# Patient Record
Sex: FEMALE | Race: WHITE | NOT HISPANIC OR LATINO | Employment: UNEMPLOYED | URBAN - NONMETROPOLITAN AREA
[De-identification: names, ages, dates, MRNs, and addresses within clinical notes are randomized per-mention and may not be internally consistent; named-entity substitution may affect disease eponyms.]

---

## 2019-03-22 LAB
EXTERNAL ABO GROUPING: NORMAL
EXTERNAL ANTIBODY SCREEN: NEGATIVE
EXTERNAL HEPATITIS B SURFACE ANTIGEN: NEGATIVE
EXTERNAL RH FACTOR: NEGATIVE
EXTERNAL RUBELLA QUALITATIVE: NORMAL
EXTERNAL SYPHILIS RPR SCREEN: NORMAL
HIV1 P24 AG SERPL QL IA: NORMAL

## 2019-07-30 LAB
EXTERNAL CHLAMYDIA SCREEN: NEGATIVE
EXTERNAL GONORRHEA SCREEN: NEGATIVE

## 2019-08-07 LAB — EXTERNAL GROUP B STREP ANTIGEN: NEGATIVE

## 2019-08-16 ENCOUNTER — HOSPITAL ENCOUNTER (INPATIENT)
Facility: HOSPITAL | Age: 19
LOS: 2 days | Discharge: HOME | End: 2019-08-18
Attending: OBSTETRICS & GYNECOLOGY | Admitting: OBSTETRICS & GYNECOLOGY

## 2019-08-16 ENCOUNTER — ANESTHESIA EVENT (OUTPATIENT)
Dept: LABOR AND DELIVERY | Facility: HOSPITAL | Age: 19
End: 2019-08-16

## 2019-08-16 ENCOUNTER — ANESTHESIA (OUTPATIENT)
Dept: LABOR AND DELIVERY | Facility: HOSPITAL | Age: 19
End: 2019-08-16

## 2019-08-16 ENCOUNTER — HOSPITAL ENCOUNTER (OUTPATIENT)
Dept: LABOR AND DELIVERY | Facility: HOSPITAL | Age: 19
Discharge: HOME | End: 2019-08-16

## 2019-08-16 PROBLEM — O36.5931 IUGR (INTRAUTERINE GROWTH RESTRICTION) AFFECTING CARE OF MOTHER, THIRD TRIMESTER, FETUS 1: Status: ACTIVE | Noted: 2019-08-16

## 2019-08-16 LAB
6-ACETYL MORPHINE: NEGATIVE
ABO GROUP BLD: NORMAL
AMPHET+METHAMPHET UR QL: NEGATIVE
BARBITURATES UR QL SCN: NEGATIVE
BASOPHILS # BLD AUTO: 0.01 10*3/MM3 (ref 0–0.2)
BASOPHILS NFR BLD AUTO: 0.1 % (ref 0–1.5)
BENZODIAZ UR QL SCN: NEGATIVE
BLD GP AB SCN SERPL QL: POSITIVE
BUPRENORPHINE SERPL-MCNC: NEGATIVE NG/ML
CANNABINOIDS SERPL QL: NEGATIVE
COCAINE UR QL: NEGATIVE
DEPRECATED RDW RBC AUTO: 43.9 FL (ref 37–54)
EOSINOPHIL # BLD AUTO: 0.12 10*3/MM3 (ref 0–0.4)
EOSINOPHIL NFR BLD AUTO: 1 % (ref 0.3–6.2)
ERYTHROCYTE [DISTWIDTH] IN BLOOD BY AUTOMATED COUNT: 12.8 % (ref 12.3–15.4)
HCT VFR BLD AUTO: 36.1 % (ref 34–46.6)
HGB BLD-MCNC: 12.4 G/DL (ref 12–15.9)
IMM GRANULOCYTES # BLD AUTO: 0.17 10*3/MM3 (ref 0–0.05)
IMM GRANULOCYTES NFR BLD AUTO: 1.5 % (ref 0–0.5)
LYMPHOCYTES # BLD AUTO: 2 10*3/MM3 (ref 0.7–3.1)
LYMPHOCYTES NFR BLD AUTO: 17.5 % (ref 19.6–45.3)
MCH RBC QN AUTO: 33.6 PG (ref 26.6–33)
MCHC RBC AUTO-ENTMCNC: 34.3 G/DL (ref 31.5–35.7)
MCV RBC AUTO: 97.8 FL (ref 79–97)
METHADONE UR QL SCN: NEGATIVE
MONOCYTES # BLD AUTO: 0.9 10*3/MM3 (ref 0.1–0.9)
MONOCYTES NFR BLD AUTO: 7.9 % (ref 5–12)
NEUTROPHILS # BLD AUTO: 8.23 10*3/MM3 (ref 1.7–7)
NEUTROPHILS NFR BLD AUTO: 72 % (ref 42.7–76)
OPIATES UR QL: NEGATIVE
OXYCODONE UR QL SCN: NEGATIVE
PCP UR QL SCN: NEGATIVE
PLATELET # BLD AUTO: 226 10*3/MM3 (ref 140–450)
PMV BLD AUTO: 10.7 FL (ref 6–12)
RBC # BLD AUTO: 3.69 10*6/MM3 (ref 3.77–5.28)
RESIDUAL RHIG DETECTED: NORMAL
RH BLD: NEGATIVE
T&S EXPIRATION DATE: NORMAL
WBC NRBC COR # BLD: 11.43 10*3/MM3 (ref 3.4–10.8)

## 2019-08-16 PROCEDURE — 80307 DRUG TEST PRSMV CHEM ANLYZR: CPT | Performed by: OBSTETRICS & GYNECOLOGY

## 2019-08-16 PROCEDURE — 86870 RBC ANTIBODY IDENTIFICATION: CPT | Performed by: OBSTETRICS & GYNECOLOGY

## 2019-08-16 PROCEDURE — 59025 FETAL NON-STRESS TEST: CPT

## 2019-08-16 PROCEDURE — C1755 CATHETER, INTRASPINAL: HCPCS

## 2019-08-16 PROCEDURE — 86901 BLOOD TYPING SEROLOGIC RH(D): CPT

## 2019-08-16 PROCEDURE — 86901 BLOOD TYPING SEROLOGIC RH(D): CPT | Performed by: OBSTETRICS & GYNECOLOGY

## 2019-08-16 PROCEDURE — 99253 IP/OBS CNSLTJ NEW/EST LOW 45: CPT | Performed by: PSYCHIATRY & NEUROLOGY

## 2019-08-16 PROCEDURE — 85025 COMPLETE CBC W/AUTO DIFF WBC: CPT | Performed by: OBSTETRICS & GYNECOLOGY

## 2019-08-16 PROCEDURE — 86850 RBC ANTIBODY SCREEN: CPT | Performed by: OBSTETRICS & GYNECOLOGY

## 2019-08-16 PROCEDURE — 86900 BLOOD TYPING SEROLOGIC ABO: CPT

## 2019-08-16 PROCEDURE — 10907ZC DRAINAGE OF AMNIOTIC FLUID, THERAPEUTIC FROM PRODUCTS OF CONCEPTION, VIA NATURAL OR ARTIFICIAL OPENING: ICD-10-PCS | Performed by: OBSTETRICS & GYNECOLOGY

## 2019-08-16 PROCEDURE — 86900 BLOOD TYPING SEROLOGIC ABO: CPT | Performed by: OBSTETRICS & GYNECOLOGY

## 2019-08-16 PROCEDURE — 36415 COLL VENOUS BLD VENIPUNCTURE: CPT | Performed by: OBSTETRICS & GYNECOLOGY

## 2019-08-16 RX ORDER — IBUPROFEN 800 MG/1
800 TABLET ORAL 3 TIMES DAILY
Status: DISCONTINUED | OUTPATIENT
Start: 2019-08-16 | End: 2019-08-18 | Stop reason: HOSPADM

## 2019-08-16 RX ORDER — OXYTOCIN-SODIUM CHLORIDE 0.9% IV SOLN 30 UNIT/500ML 30-0.9/5 UT/ML-%
250 SOLUTION INTRAVENOUS CONTINUOUS
Status: ACTIVE | OUTPATIENT
Start: 2019-08-16 | End: 2019-08-16

## 2019-08-16 RX ORDER — PRENATAL VIT/IRON FUM/FOLIC AC 27MG-0.8MG
1 TABLET ORAL DAILY
Status: DISCONTINUED | OUTPATIENT
Start: 2019-08-17 | End: 2019-08-16

## 2019-08-16 RX ORDER — MAGNESIUM HYDROXIDE 1200 MG/15ML
1000 LIQUID ORAL ONCE AS NEEDED
Status: COMPLETED | OUTPATIENT
Start: 2019-08-16 | End: 2019-08-16

## 2019-08-16 RX ORDER — DOCUSATE SODIUM 100 MG/1
100 CAPSULE, LIQUID FILLED ORAL DAILY
Status: DISCONTINUED | OUTPATIENT
Start: 2019-08-16 | End: 2019-08-18 | Stop reason: HOSPADM

## 2019-08-16 RX ORDER — IBUPROFEN 800 MG/1
800 TABLET ORAL EVERY 8 HOURS SCHEDULED
Status: DISCONTINUED | OUTPATIENT
Start: 2019-08-16 | End: 2019-08-16 | Stop reason: HOSPADM

## 2019-08-16 RX ORDER — SODIUM CHLORIDE 0.9 % (FLUSH) 0.9 %
1-10 SYRINGE (ML) INJECTION AS NEEDED
Status: DISCONTINUED | OUTPATIENT
Start: 2019-08-16 | End: 2019-08-18 | Stop reason: HOSPADM

## 2019-08-16 RX ORDER — LIDOCAINE HYDROCHLORIDE 20 MG/ML
INJECTION, SOLUTION EPIDURAL; INFILTRATION; INTRACAUDAL; PERINEURAL
Status: DISCONTINUED
Start: 2019-08-16 | End: 2019-08-18 | Stop reason: HOSPADM

## 2019-08-16 RX ORDER — LIDOCAINE HYDROCHLORIDE AND EPINEPHRINE 10; 10 MG/ML; UG/ML
INJECTION, SOLUTION INFILTRATION; PERINEURAL AS NEEDED
Status: SHIPPED | OUTPATIENT
Start: 2019-08-16

## 2019-08-16 RX ORDER — ONDANSETRON 2 MG/ML
4 INJECTION INTRAMUSCULAR; INTRAVENOUS EVERY 6 HOURS PRN
Status: DISCONTINUED | OUTPATIENT
Start: 2019-08-16 | End: 2019-08-16

## 2019-08-16 RX ORDER — SODIUM CHLORIDE 0.9 % (FLUSH) 0.9 %
3-10 SYRINGE (ML) INJECTION AS NEEDED
Status: DISCONTINUED | OUTPATIENT
Start: 2019-08-16 | End: 2019-08-16

## 2019-08-16 RX ORDER — CARBOPROST TROMETHAMINE 250 UG/ML
250 INJECTION, SOLUTION INTRAMUSCULAR AS NEEDED
Status: DISCONTINUED | OUTPATIENT
Start: 2019-08-16 | End: 2019-08-16 | Stop reason: HOSPADM

## 2019-08-16 RX ORDER — METHYLERGONOVINE MALEATE 0.2 MG/ML
200 INJECTION INTRAVENOUS ONCE AS NEEDED
Status: DISCONTINUED | OUTPATIENT
Start: 2019-08-16 | End: 2019-08-16 | Stop reason: HOSPADM

## 2019-08-16 RX ORDER — PRENATAL VIT/IRON FUM/FOLIC AC 27MG-0.8MG
1 TABLET ORAL DAILY
Status: ON HOLD | COMMUNITY
End: 2021-03-18

## 2019-08-16 RX ORDER — TERBUTALINE SULFATE 1 MG/ML
0.25 INJECTION, SOLUTION SUBCUTANEOUS AS NEEDED
Status: DISCONTINUED | OUTPATIENT
Start: 2019-08-16 | End: 2019-08-16

## 2019-08-16 RX ORDER — BISACODYL 10 MG
10 SUPPOSITORY, RECTAL RECTAL DAILY PRN
Status: DISCONTINUED | OUTPATIENT
Start: 2019-08-17 | End: 2019-08-18 | Stop reason: HOSPADM

## 2019-08-16 RX ORDER — HYDROCODONE BITARTRATE AND ACETAMINOPHEN 5; 325 MG/1; MG/1
1 TABLET ORAL EVERY 4 HOURS PRN
Status: DISCONTINUED | OUTPATIENT
Start: 2019-08-16 | End: 2019-08-18 | Stop reason: HOSPADM

## 2019-08-16 RX ORDER — ACETAMINOPHEN 325 MG/1
650 TABLET ORAL EVERY 4 HOURS PRN
Status: DISCONTINUED | OUTPATIENT
Start: 2019-08-16 | End: 2019-08-16

## 2019-08-16 RX ORDER — ONDANSETRON 2 MG/ML
4 INJECTION INTRAMUSCULAR; INTRAVENOUS EVERY 6 HOURS PRN
Status: DISCONTINUED | OUTPATIENT
Start: 2019-08-16 | End: 2019-08-18 | Stop reason: HOSPADM

## 2019-08-16 RX ORDER — ROPIVACAINE HYDROCHLORIDE 2 MG/ML
INJECTION, SOLUTION EPIDURAL; INFILTRATION; PERINEURAL
Status: DISCONTINUED
Start: 2019-08-16 | End: 2019-08-18 | Stop reason: HOSPADM

## 2019-08-16 RX ORDER — ONDANSETRON 4 MG/1
4 TABLET, FILM COATED ORAL EVERY 6 HOURS PRN
Status: DISCONTINUED | OUTPATIENT
Start: 2019-08-16 | End: 2019-08-16

## 2019-08-16 RX ORDER — BUTORPHANOL TARTRATE 1 MG/ML
1 INJECTION, SOLUTION INTRAMUSCULAR; INTRAVENOUS
Status: DISCONTINUED | OUTPATIENT
Start: 2019-08-16 | End: 2019-08-16

## 2019-08-16 RX ORDER — OXYTOCIN-SODIUM CHLORIDE 0.9% IV SOLN 30 UNIT/500ML 30-0.9/5 UT/ML-%
125 SOLUTION INTRAVENOUS CONTINUOUS PRN
Status: DISCONTINUED | OUTPATIENT
Start: 2019-08-16 | End: 2019-08-18 | Stop reason: HOSPADM

## 2019-08-16 RX ORDER — MISOPROSTOL 100 UG/1
600 TABLET ORAL ONCE AS NEEDED
Status: COMPLETED | OUTPATIENT
Start: 2019-08-16 | End: 2019-08-16

## 2019-08-16 RX ORDER — MINERAL OIL
30 OIL (ML) MISCELLANEOUS AS NEEDED
Status: DISCONTINUED | OUTPATIENT
Start: 2019-08-16 | End: 2019-08-16

## 2019-08-16 RX ORDER — LANOLIN 100 %
OINTMENT (GRAM) TOPICAL
Status: DISCONTINUED | OUTPATIENT
Start: 2019-08-16 | End: 2019-08-18 | Stop reason: HOSPADM

## 2019-08-16 RX ORDER — LIDOCAINE HYDROCHLORIDE 10 MG/ML
INJECTION, SOLUTION EPIDURAL; INFILTRATION; INTRACAUDAL; PERINEURAL
Status: DISCONTINUED
Start: 2019-08-16 | End: 2019-08-18 | Stop reason: HOSPADM

## 2019-08-16 RX ORDER — OXYTOCIN-SODIUM CHLORIDE 0.9% IV SOLN 30 UNIT/500ML 30-0.9/5 UT/ML-%
2-30 SOLUTION INTRAVENOUS
Status: DISCONTINUED | OUTPATIENT
Start: 2019-08-16 | End: 2019-08-16

## 2019-08-16 RX ORDER — ONDANSETRON 4 MG/1
4 TABLET, FILM COATED ORAL EVERY 8 HOURS PRN
Status: DISCONTINUED | OUTPATIENT
Start: 2019-08-16 | End: 2019-08-18 | Stop reason: HOSPADM

## 2019-08-16 RX ORDER — OXYTOCIN-SODIUM CHLORIDE 0.9% IV SOLN 30 UNIT/500ML 30-0.9/5 UT/ML-%
999 SOLUTION INTRAVENOUS ONCE
Status: DISCONTINUED | OUTPATIENT
Start: 2019-08-16 | End: 2019-08-18 | Stop reason: HOSPADM

## 2019-08-16 RX ORDER — SODIUM CHLORIDE, SODIUM LACTATE, POTASSIUM CHLORIDE, CALCIUM CHLORIDE 600; 310; 30; 20 MG/100ML; MG/100ML; MG/100ML; MG/100ML
125 INJECTION, SOLUTION INTRAVENOUS CONTINUOUS
Status: DISCONTINUED | OUTPATIENT
Start: 2019-08-16 | End: 2019-08-16

## 2019-08-16 RX ORDER — MISOPROSTOL 100 UG/1
600 TABLET ORAL AS NEEDED
Status: DISCONTINUED | OUTPATIENT
Start: 2019-08-16 | End: 2019-08-16 | Stop reason: HOSPADM

## 2019-08-16 RX ADMIN — BENZOCAINE 1 APPLICATION: 5.6 OINTMENT TOPICAL at 16:17

## 2019-08-16 RX ADMIN — SODIUM CHLORIDE, POTASSIUM CHLORIDE, SODIUM LACTATE AND CALCIUM CHLORIDE 1000 ML: 600; 310; 30; 20 INJECTION, SOLUTION INTRAVENOUS at 11:20

## 2019-08-16 RX ADMIN — HYDROCODONE BITARTRATE AND ACETAMINOPHEN 1 TABLET: 5; 325 TABLET ORAL at 20:59

## 2019-08-16 RX ADMIN — OXYTOCIN 2 MILLI-UNITS/MIN: 10 INJECTION INTRAVENOUS at 07:30

## 2019-08-16 RX ADMIN — HYDROCODONE BITARTRATE AND ACETAMINOPHEN 1 TABLET: 5; 325 TABLET ORAL at 16:17

## 2019-08-16 RX ADMIN — BENZOCAINE AND LEVOMENTHOL: 200; 5 SPRAY TOPICAL at 16:17

## 2019-08-16 RX ADMIN — IBUPROFEN 800 MG: 800 TABLET, FILM COATED ORAL at 20:54

## 2019-08-16 RX ADMIN — MISOPROSTOL 600 MCG: 100 TABLET ORAL at 20:53

## 2019-08-16 RX ADMIN — WITCH HAZEL 1 PAD: 500 SOLUTION RECTAL; TOPICAL at 16:17

## 2019-08-16 RX ADMIN — IBUPROFEN 800 MG: 800 TABLET, FILM COATED ORAL at 16:16

## 2019-08-16 RX ADMIN — SODIUM CHLORIDE 1000 ML: 900 IRRIGANT IRRIGATION at 13:53

## 2019-08-16 RX ADMIN — HYDROCORTISONE 2.5% 1 APPLICATION: 25 CREAM TOPICAL at 16:17

## 2019-08-16 RX ADMIN — SODIUM CHLORIDE, POTASSIUM CHLORIDE, SODIUM LACTATE AND CALCIUM CHLORIDE 125 ML/HR: 600; 310; 30; 20 INJECTION, SOLUTION INTRAVENOUS at 07:25

## 2019-08-16 RX ADMIN — LIDOCAINE HYDROCHLORIDE AND EPINEPHRINE 2 ML: 10; 10 INJECTION, SOLUTION INFILTRATION; PERINEURAL at 11:44

## 2019-08-16 NOTE — CONSULTS
Referring Provider: Dr. March  Reason for Consultation: Agitation      Chief complaint/Focus of Exam: anger and agitation    Subjective .     History of present illness:  The patient is a 18 y/o female with past history significant for oppositional defiant disorder, and depression. She came in to L&D for induction and after three hours got impatient and wanted to leave, and at that point was quite agitated and non-cooperative with the staff and also voiced homicidal ideations, no one in particular. Psychiatry was consulted for assesment of patient's mental status and capacity to make informed decisions. The patient was seen in her room with nursing staff present. She turned her face away and refused to answer any question. She is able to understand the questions as she shook and nodded her head a couple of times, but appears to be angry and is making a deliberate effort to not cooperate.    Past Psych History: Patient was admitted to the [facility] in 2015 at age 14, when she refused to go to school and threatened suicide. She was also in the partial program for about a month.    Substance Use History: Pt has admitted to smoking marijuana earlier in the pregnancy but her UDS is negative.    Review of Systems  The patient refused to cooperate.    History  Past Medical History:   Diagnosis Date   • Anxiety    • Depression    • Urogenital trichomoniasis    ,   Past Surgical History:   Procedure Laterality Date   • EYE SURGERY Bilateral     GROWTH REMOVAL   ,   Family History   Problem Relation Age of Onset   • Lung cancer Father    • No Known Problems Mother    • No Known Problems Brother    • Seizures Sister    • Hydrocephalus Sister    • No Known Problems Paternal Grandfather    • No Known Problems Paternal Grandmother    • Hypertension Maternal Grandmother    • Diabetes Maternal Grandmother    • Coronary artery disease Maternal Grandmother    • Alzheimer's disease Maternal Grandmother    •  Lung cancer Maternal Grandfather    ,   Social History     Tobacco Use   • Smoking status: Current Every Day Smoker     Packs/day: 1.00     Years: 5.00     Pack years: 5.00   • Smokeless tobacco: Never Used   Substance Use Topics   • Alcohol use: No     Frequency: Never   • Drug use: Yes     Types: Marijuana   ,   Medications Prior to Admission   Medication Sig Dispense Refill Last Dose   • Prenatal Vit-Fe Fumarate-FA (PRENATAL VITAMIN 27-0.8) 27-0.8 MG tablet tablet Take 1 tablet by mouth Daily.   8/16/2019 at AM   , Scheduled Meds:    [START ON 8/17/2019] prenatal vitamin 27-0.8 1 tablet Oral Daily   , Continuous Infusions:    lactated ringers 125 mL/hr Last Rate: 125 mL/hr (08/16/19 0725)   oxytocin 2-30 branden-units/min Last Rate: 10 branden-units/min (08/16/19 0930)   , PRN Meds:  •  acetaminophen  •  butorphanol  •  butorphanol  •  lactated ringers  •  mineral oil  •  ondansetron **OR** ondansetron  •  sodium chloride  •  sodium chloride  •  terbutaline and Allergies:  Patient has no known allergies.    Objective     Vital Signs   Temp:  [96.4 °F (35.8 °C)-97.3 °F (36.3 °C)] 97.3 °F (36.3 °C)  Heart Rate:  [69-74] 70  Resp:  [18] 18  BP: (102-119)/(62-73) 112/65    Mental Status Exam:   Mental Status Exam:    Hygiene:   fair  Cooperation:  Evasive  Eye Contact:  Poor  Psychomotor Behavior:  Appropriate  Affect:  Restricted  Speech:  Minimal  Thought Progress:  Goal directed  Thought Content:  Normal  Suicidal: The patient refused to cooperate.  Homicidal:  The patient refused to cooperate.  Hallucinations:  None apparent  Delusion:  None  Memory:  The patient refused to cooperate.  Orientation:  The patient refused to cooperate.  Reliability:  poor  Insight:  Poor  Judgement:  Poor  Impulse Control:  Poor    Results Review:   I reviewed the patient's new clinical results.  Lab Results (last 24 hours)     Procedure Component Value Units Date/Time    Urine Drug Screen - Urine, Clean Catch [[id number]]  (Normal)  Collected:  08/16/19 0617    Specimen:  Urine, Clean Catch Updated:  08/16/19 0809     Amphetamine Screen, Urine Negative     Barbiturates Screen, Urine Negative     Benzodiazepine Screen, Urine Negative     Cocaine Screen, Urine Negative     Methadone Screen, Urine Negative     Opiate Screen Negative     Phencyclidine (PCP), Urine Negative     THC, Screen, Urine Negative     6-ACETYL MORPHINE Negative     Buprenorphine, Screen, Urine Negative     Oxycodone Screen, Urine Negative    Narrative:       Negative Thresholds For Drugs Screened:                  Amphetamines              1000 ng/ml               Barbiturates               200 ng/ml               Benzodiazepines            200 ng/ml              Cocaine                    300 ng/ml              Methadone                  300 ng/ml              Opiates                    300 ng/ml               Phencyclidine               25 ng/ml               THC                         50 ng/ml              6-Acetyl Morphine           10 ng/ml              Buprenorphine                5 ng/ml              Oxycodone                  300 ng/ml    The reference range for all drugs tested is negative. This report includes final unconfirmed qualitative results to be used for medical treatment purposes only. Unconfirmed results must not be used for non-medical purposes such as employment or legal testing. Clinical consideration should be applied to any drug of abuse test, especially when unconfirmed quantitative results are used.      CBC & Differential [[id number]] Collected:  08/16/19 0634    Specimen:  Blood Updated:  08/16/19 0759    Narrative:       The following orders were created for panel order CBC & Differential.  Procedure                               Abnormality         Status                     ---------                               -----------         ------                     CBC Auto Differential[[id number]]        Abnormal            Final result                  Please view results for these tests on the individual orders.    CBC Auto Differential [[id number]]  (Abnormal) Collected:  08/16/19 0634    Specimen:  Blood Updated:  08/16/19 0759     WBC 11.43 10*3/mm3      RBC 3.69 10*6/mm3      Hemoglobin 12.4 g/dL      Hematocrit 36.1 %      MCV 97.8 fL      MCH 33.6 pg      MCHC 34.3 g/dL      RDW 12.8 %      RDW-SD 43.9 fl      MPV 10.7 fL      Platelets 226 10*3/mm3      Neutrophil % 72.0 %      Lymphocyte % 17.5 %      Monocyte % 7.9 %      Eosinophil % 1.0 %      Basophil % 0.1 %      Immature Grans % 1.5 %      Neutrophils, Absolute 8.23 10*3/mm3      Lymphocytes, Absolute 2.00 10*3/mm3      Monocytes, Absolute 0.90 10*3/mm3      Eosinophils, Absolute 0.12 10*3/mm3      Basophils, Absolute 0.01 10*3/mm3      Immature Grans, Absolute 0.17 10*3/mm3     Gonorrhea Screen - Swab, [[id number]] Resulted:  07/30/19     Specimen:  Swab Updated:  08/16/19 0647     External Gonorrhea Screen Negative    Hepatitis B Surface Antigen [[id number]] Resulted:  03/22/19     Specimen:  Blood Updated:  08/16/19 0647     External Hepatitis B Surface Ag Negative    RPR [[id number]] Resulted:  03/22/19     Specimen:  Blood Updated:  08/16/19 0647     External RPR Non-Reactive    Rubella Antibody, IgG [[id number]] Resulted:  03/22/19     Specimen:  Blood Updated:  08/16/19 0647     External Rubella Qual Immune    HIV-1 Antibody, EIA [[id number]] Resulted:  03/22/19     Specimen:  Blood Updated:  08/16/19 0647     External HIV Antibody Non-Reactive    Group B Streptococcus Culture - Swab, Vaginal/Rectum [[id number]] Resulted:  08/07/19     Specimen:  Swab from Vaginal/Rectum Updated:  08/16/19 0647     External Strep Group B Ag Negative    Chlamydia trachomatis, Neisseria gonorrhoeae, PCR w/ confirmation - Swab, Vagina [[id number]] Resulted:  07/30/19     Specimen:  Swab from Vagina Updated:  08/16/19 0647     External Chlamydia Screen Negative        Imaging Results (last 24 hours)     ** No results  found for the last 24 hours. **            Assessment/Plan     Active Problems:    IUGR (intrauterine growth restriction) affecting care of mother, third trimester, fetus 1     Adjustment disorder with disturbance of emotion and conduct.    The patient is making a deliberate attempt to not cooperate, and doesn't appear to be psychotic, manic or delirious. Appears more a personality issue at this time as she got tired of being in labor after 3 hours, and wanted to smoke and threatened to leave when she was informed she couldn't. The patient is obviously making poor choices and lacks insight and appropriate judgment and if she continues to act in this fashion and demands to leave, will need to ask family to take over decision making.      I discussed the patients findings and my recommendations with patient, nursing staff and primary care team    Randal Cartagena MD  08/16/19  10:33 AM

## 2019-08-16 NOTE — L&D DELIVERY NOTE
Vaginal Delivery Procedure Note    Deanna Cuello  Gestational Age: <None>         OBGYN: Baldo March MD      Pre-op Diagnosis: Complete Dilation      Anesthesia: Epidual        Detailed Description of Procedure   This is a 27-year old G1, P0 patient at 37 weeks +6 days, a patient of Dr. Bennett.  She had a 14-week ultrasound that showed an EDC of 2019.  She has IUGR at the 3rd percentile and the chart says that PDC was consulted, and they recommended delivery at 38 weeks.      She was pushing extremely ineffectively.  She had a solid epidural.  She was in the occiput anterior position, almost  but going nowhere because she was not pushing, she was just crying.  We did very gentle vacuum delivery, 1 pop off, midline episiotomy, sutured with 2-0 chromic.  Placenta delivered intact, uterus contracted well, 150 cc.  Father, mother, and baby are all stable in the birthing room at this time.           Disposition: Transfer to Women's Health Floor  Condition: Stable    Baldo March M.D     Date: 2019  Time: 1:57 PM    Deanna Cuello, 2000

## 2019-08-16 NOTE — PLAN OF CARE
Problem: Patient Care Overview  Goal: Plan of Care Review  Outcome: Ongoing (interventions implemented as appropriate)   08/16/19 0939   Coping/Psychosocial   Plan of Care Reviewed With patient;sibling     Goal: Individualization and Mutuality  Outcome: Ongoing (interventions implemented as appropriate)    Goal: Discharge Needs Assessment   08/16/19 0939   Discharge Needs Assessment   Readmission Within the Last 30 Days no previous admission in last 30 days   Concerns to be Addressed homicidal   Patient/Family Anticipates Transition to home with help/services;family members' home   Patient/Family Anticipated Services at Transition mental health services;other (see comments)  ()   Transportation Concerns car, none   Transportation Anticipated family or friend will provide   Anticipated Changes Related to Illness other (see comments)  (questionable ability to provide adequate care for infant )   Equipment Needed After Discharge none   Disability   Equipment Currently Used at Home none     Goal: Interprofessional Rounds/Family Conf  Outcome: Ongoing (interventions implemented as appropriate)      Problem: Labor (Cervical Ripen, Induct, Augment) (Adult,Obstetrics,Pediatric)  Goal: Signs and Symptoms of Listed Potential Problems Will be Absent, Minimized or Managed (Labor)  Outcome: Ongoing (interventions implemented as appropriate)   08/16/19 0939   Goal/Outcome Evaluation   Problems Assessed (Labor) all   Problems Present (Labor) none

## 2019-08-16 NOTE — H&P
HISTORY    Chief complaint  In for induction secondary to IUGR    History of present illness  This is a 27-year old G1, P0 patient at 37 weeks +6 days, a patient of Dr. Bennett.  She had a 14-week ultrasound that showed an EDC of August 31, 2019.  She has IUGR at the 3rd percentile and the chart says that PDC was consulted, and they recommended delivery at 38 weeks.    Past medical, surgical, obstetrical history  See attached prenatal record    Family history  See attached prenatal record    Social history  See attached prenatal record    Functional inquiry  See attached prenatal record                                                   PHYSICAL EXAMINATION    General  In no acute distress    HEENT  Throat and ears are clear, no masses or nodes were palpable    CVR  Heart sounds are normal with no murmurs, chest is clear to IPPA    GI/  Abdomen is soft with no masses tenderness or organomegaly.  Uterine size is commensurate with her dates.  Cervix is as noted elsewhere in chart    MS  No gross bone or joint abnormalities.                                                                    IMPRESSION    38 weeks, IUGR.                                                                TREATMENT PLAN    I ruptured her membranes for clear amniotic fluid.  She is about 3-4, 90%.  We will start Pitocin.  Expect spontaneous vaginal delivery.  Deanna Cuello, July 8, 2000

## 2019-08-16 NOTE — NURSING NOTE
"Pt's sister comes to nurses station and states pt is upset because she thought she would be delivered by now and wants a cigarette. States pt is screaming. In to see pt. Sat on pt's bed and discussed reason why pt had to remain on monitor (in case of fetal distress) and therefore she could not go smoke, but offered to get a nicotine patch for pt. Pt states she knows they do not work. Pt very upset and crying that she is not delivered yet. States she thought this was supposed to be over fast and she does not want to be here. Explained to pt she is here because her baby is growth restricted and it would be dangerous for her to go home undelivered. Pt stares off while having a conversation with her. When questioned how she knows nicotine patches do not work she states \"[facility]\". When asked what her diagnosis was in the [facility] or why she was treated there she states \"because my dad  and I wanted to kill everybody and I want to kill everybody now\". Explained to pt I would have to pass this along and she said \"I knew you would say that\". Continued to have long discussion with pt regarding the induction process and this being part of our roles of mothers. Pt continues to stare off and only states \"woohoo\" in response to most statements.  Dr March notified of pt's behavior. Psych consult notified. Spoke to lead nurse in psych. In to tell pt that someone from the [facility] will be over to see her. Pt has sheet over her head and states \"woohoo\".   "

## 2019-08-16 NOTE — H&P
Chief complaint   Chief Complaint   Patient presents with   • Scheduled Induction     PRESENTS VIA W/C TO L&D FOR JONATHAN IND OF LABOR D/T IUGR. STATES HAVING OCC CTX, DENIES VB OR LOF. STATES BABY IS MOVING WELL.        History of Present Illness: Patient is a 19 y.o. female who presents with IUP at 37w6d weeks gestation. G 1, P 0. IUGR. PDC contacted. Recommend an IOL at 38 weeks gestation. EFW 3%      Past Medical History:   Diagnosis Date   • Anxiety    • Depression    • Urogenital trichomoniasis    + UDS THC    Blood Type: O +  Fetal Gender: Male  GBS: Negative  Past Surgical History:   Procedure Laterality Date   • EYE SURGERY Bilateral     GROWTH REMOVAL     Family History   Problem Relation Age of Onset   • Lung cancer Father    • No Known Problems Mother    • No Known Problems Brother    • Seizures Sister    • Hydrocephalus Sister    • No Known Problems Paternal Grandfather    • No Known Problems Paternal Grandmother    • Hypertension Maternal Grandmother    • Diabetes Maternal Grandmother    • Coronary artery disease Maternal Grandmother    • Alzheimer's disease Maternal Grandmother    • Lung cancer Maternal Grandfather      Social History     Tobacco Use   • Smoking status: Current Every Day Smoker     Packs/day: 1.00     Years: 5.00     Pack years: 5.00   • Smokeless tobacco: Never Used   Substance Use Topics   • Alcohol use: No     Frequency: Never   • Drug use: Yes     Types: Marijuana     Medications Prior to Admission   Medication Sig Dispense Refill Last Dose   • Prenatal Vit-Fe Fumarate-FA (PRENATAL VITAMIN 27-0.8) 27-0.8 MG tablet tablet Take 1 tablet by mouth Daily.   8/16/2019 at AM     Allergies:  Patient has no known allergies.      Vital Signs  Temp:  [96.4 °F (35.8 °C)] 96.4 °F (35.8 °C)  Heart Rate:  [73] 73  Resp:  [18] 18  BP: (119)/(73) 119/73    Radiology  Imaging Results (last 24 hours)     ** No results found for the last 24 hours. **          Labs  Lab Results (last 24 hours)      Procedure Component Value Units Date/Time    Urine Drug Screen - Urine, Clean Catch [[id number]]  (Normal) Collected:  08/16/19 0617    Specimen:  Urine, Clean Catch Updated:  08/16/19 0809     Amphetamine Screen, Urine Negative     Barbiturates Screen, Urine Negative     Benzodiazepine Screen, Urine Negative     Cocaine Screen, Urine Negative     Methadone Screen, Urine Negative     Opiate Screen Negative     Phencyclidine (PCP), Urine Negative     THC, Screen, Urine Negative     6-ACETYL MORPHINE Negative     Buprenorphine, Screen, Urine Negative     Oxycodone Screen, Urine Negative    Narrative:       Negative Thresholds For Drugs Screened:                  Amphetamines              1000 ng/ml               Barbiturates               200 ng/ml               Benzodiazepines            200 ng/ml              Cocaine                    300 ng/ml              Methadone                  300 ng/ml              Opiates                    300 ng/ml               Phencyclidine               25 ng/ml               THC                         50 ng/ml              6-Acetyl Morphine           10 ng/ml              Buprenorphine                5 ng/ml              Oxycodone                  300 ng/ml    The reference range for all drugs tested is negative. This report includes final unconfirmed qualitative results to be used for medical treatment purposes only. Unconfirmed results must not be used for non-medical purposes such as employment or legal testing. Clinical consideration should be applied to any drug of abuse test, especially when unconfirmed quantitative results are used.      CBC & Differential [[id number]] Collected:  08/16/19 0634    Specimen:  Blood Updated:  08/16/19 3001    Narrative:       The following orders were created for panel order CBC & Differential.  Procedure                               Abnormality         Status                     ---------                               -----------         ------                      CBC Auto Differential[[id number]]        Abnormal            Final result                 Please view results for these tests on the individual orders.    CBC Auto Differential [[id number]]  (Abnormal) Collected:  08/16/19 0634    Specimen:  Blood Updated:  08/16/19 0759     WBC 11.43 10*3/mm3      RBC 3.69 10*6/mm3      Hemoglobin 12.4 g/dL      Hematocrit 36.1 %      MCV 97.8 fL      MCH 33.6 pg      MCHC 34.3 g/dL      RDW 12.8 %      RDW-SD 43.9 fl      MPV 10.7 fL      Platelets 226 10*3/mm3      Neutrophil % 72.0 %      Lymphocyte % 17.5 %      Monocyte % 7.9 %      Eosinophil % 1.0 %      Basophil % 0.1 %      Immature Grans % 1.5 %      Neutrophils, Absolute 8.23 10*3/mm3      Lymphocytes, Absolute 2.00 10*3/mm3      Monocytes, Absolute 0.90 10*3/mm3      Eosinophils, Absolute 0.12 10*3/mm3      Basophils, Absolute 0.01 10*3/mm3      Immature Grans, Absolute 0.17 10*3/mm3     Gonorrhea Screen - Swab, [[id number]] Resulted:  07/30/19     Specimen:  Swab Updated:  08/16/19 0647     External Gonorrhea Screen Negative    Hepatitis B Surface Antigen [[id number]] Resulted:  03/22/19     Specimen:  Blood Updated:  08/16/19 0647     External Hepatitis B Surface Ag Negative    RPR [[id number]] Resulted:  03/22/19     Specimen:  Blood Updated:  08/16/19 0647     External RPR Non-Reactive    Rubella Antibody, IgG [[id number]] Resulted:  03/22/19     Specimen:  Blood Updated:  08/16/19 0647     External Rubella Qual Immune    HIV-1 Antibody, EIA [[id number]] Resulted:  03/22/19     Specimen:  Blood Updated:  08/16/19 0647     External HIV Antibody Non-Reactive    Group B Streptococcus Culture - Swab, Vaginal/Rectum [[id number]] Resulted:  08/07/19     Specimen:  Swab from Vaginal/Rectum Updated:  08/16/19 0647     External Strep Group B Ag Negative    Chlamydia trachomatis, Neisseria gonorrhoeae, PCR w/ confirmation - Swab, Vagina [[id number]] Resulted:  07/30/19     Specimen:  Swab from Vagina Updated:   08/16/19 0647     External Chlamydia Screen Negative            Review of Systems    The following systems were reviewed and negative;  ENT, respiratory, cardiovascular, gastrointestinal, genitourinary, breast, endocrine and allergies / immunologic.      Physical Exam:      General Appearance:    Alert, cooperative, in no acute distress   Head:    Normocephalic, without obvious abnormality, atraumatic   Eyes:            Lids and lashes normal, conjunctivae and sclerae normal, no   icterus, no pallor, corneas clear, PERRLA   Ears:    Ears appear intact with no abnormalities noted   Throat:   No oral lesions, no thrush, oral mucosa moist   Neck:   No adenopathy, supple, trachea midline, no thyromegaly, no     carotid bruit, no JVD   Back:     No kyphosis present, no scoliosis present, no skin lesions,       erythema or scars, no tenderness to percussion or                   palpation,   range of motion normal   Lungs:     Clear to auscultation,respirations regular, even and                   unlabored    Heart:    Regular rhythm and normal rate, normal S1 and S2, no            murmur, no gallop, no rub, no click   Breast Exam:    Deferred   Abdomen:     Normal bowel sounds, no masses, no organomegaly, soft        non-tender, non-distended, no guarding, no rebound                 tenderness   Genitalia:    Deferred   Extremities:   Moves all extremities well, no edema, no cyanosis, no              redness   Pulses:   Pulses palpable and equal bilaterally   Skin:   No bleeding, bruising or rash   Lymph nodes:   No palpable adenopathy   Neurologic:   Cranial nerves 2 - 12 grossly intact, sensation intact, DTR        present and equal bilaterally         Assessment: Patient is a 19 y.o. female who presents with IUP at 37w6d weeks gestation. G 1, P 0. IUGR. EFW 3%. PDC consulted. Recommend IOL at 38 weeks gestation.   Chief Complaint   Patient presents with   • Scheduled Induction     PRESENTS VIA W/C TO L&D FOR [facility] IND OF  LABOR D/T IUGR. STATES HAVING OCC CTX, DENIES VB OR LOF. STATES BABY IS MOVING WELL.        Plan of Care: Admit. Proceed with an IOL per PDC.      Nav Wood III, MD  08/16/19  8:32 AM

## 2019-08-16 NOTE — ANESTHESIA PROCEDURE NOTES
Labor Epidural      Patient location during procedure: OB  Indication:at surgeon's request  Performed By  Anesthesiologist: Joe Hung MD  Preanesthetic Checklist  Completed: patient identified, site marked, surgical consent, pre-op evaluation, timeout performed, IV checked, risks and benefits discussed and monitors and equipment checked  Prep:  Pt Position:sitting  Sterile Tech:gloves, mask, sterile barrier and cap  Prep:povidone-iodine 7.5% surgical scrub  Monitoring:blood pressure monitoring  Epidural Block Procedure:  Approach:midline  Guidance:landmark technique and palpation technique  Location:L3-L4  Needle Type:Tuohy  Needle Gauge:17 G  Cath Depth at skin:9 cm  Paresthesia: none  Aspiration:negative  Test Dose:negative  Number of Attempts: 1  Post Assessment:  Dressing:occlusive dressing applied and secured with tape  Pt Tolerance:patient tolerated the procedure well with no apparent complications  Complications:no

## 2019-08-16 NOTE — PROGRESS NOTES
Case Management/Social Work    Patient Name:  Deanna Cuello  YOB: 2000  MRN: [id number]  Admit Date:  8/16/2019          SS received consult on this date positive THC, recent consult for wanting to hurt everyone. SS reviewed the chart and spoke with the nurse. Concerns with the behavior and actions of the pt during pregnancy. SS spoke with the pt on this date. Pt delivered a baby boy Reji Bhatia. FOB is not involved. Pt’s boyfriend is at bedside. Pt does not have any other children. Pt lives at home with her sister Rosemarie and Rosemarie’s . Pt states that she has a car seat and infant care supplies needed for the infant. Pt states that she used THC about 2 months ago.     Pt states that she has dealt with depression in the past but is not on medication at this time. Pt states that her depression has turned to anger over time  .   SS made report on this date to Central Intake regarding Dr. Cartagena's note and the concerns from nurses stating that the pt made comments after the infant was born about how it was nasty. Report was made to [facility] on call worker. Report not accepted. SS to be contacted with any other concerns.     Infant and the pt to be transported home by her sister Rosemarie.     SS will follow up with infants Meconium.      Electronically signed by:  Shanique Zamorano  08/16/19 4:45 PM

## 2019-08-16 NOTE — PLAN OF CARE
Problem: Patient Care Overview  Goal: Plan of Care Review  Outcome: Ongoing (interventions implemented as appropriate)   08/16/19 9397   Coping/Psychosocial   Plan of Care Reviewed With patient   Plan of Care Review   Progress improving   OTHER   Outcome Summary Fundus firm, small rubra bleeding. Hem+. Voiding well. INT patent. Norco as needed for pain.     Goal: Individualization and Mutuality  Outcome: Ongoing (interventions implemented as appropriate)    Goal: Discharge Needs Assessment  Outcome: Ongoing (interventions implemented as appropriate)    Goal: Interprofessional Rounds/Family Conf  Outcome: Ongoing (interventions implemented as appropriate)      Problem: Postpartum (Vaginal Delivery) (Adult,Obstetrics,Pediatric)  Goal: Signs and Symptoms of Listed Potential Problems Will be Absent, Minimized or Managed (Postpartum)  Outcome: Ongoing (interventions implemented as appropriate)

## 2019-08-17 LAB
ABO GROUP BLD: NORMAL
BASOPHILS # BLD AUTO: 0.01 10*3/MM3 (ref 0–0.2)
BASOPHILS NFR BLD AUTO: 0.1 % (ref 0–1.5)
DEPRECATED RDW RBC AUTO: 42.7 FL (ref 37–54)
EOSINOPHIL # BLD AUTO: 0.13 10*3/MM3 (ref 0–0.4)
EOSINOPHIL NFR BLD AUTO: 1.3 % (ref 0.3–6.2)
ERYTHROCYTE [DISTWIDTH] IN BLOOD BY AUTOMATED COUNT: 12.6 % (ref 12.3–15.4)
FETAL BLEED: NEGATIVE
HCT VFR BLD AUTO: 28.2 % (ref 34–46.6)
HGB BLD-MCNC: 9.3 G/DL (ref 12–15.9)
IMM GRANULOCYTES # BLD AUTO: 0.08 10*3/MM3 (ref 0–0.05)
IMM GRANULOCYTES NFR BLD AUTO: 0.8 % (ref 0–0.5)
LYMPHOCYTES # BLD AUTO: 2.25 10*3/MM3 (ref 0.7–3.1)
LYMPHOCYTES NFR BLD AUTO: 23 % (ref 19.6–45.3)
MCH RBC QN AUTO: 32.6 PG (ref 26.6–33)
MCHC RBC AUTO-ENTMCNC: 33 G/DL (ref 31.5–35.7)
MCV RBC AUTO: 98.9 FL (ref 79–97)
MONOCYTES # BLD AUTO: 0.87 10*3/MM3 (ref 0.1–0.9)
MONOCYTES NFR BLD AUTO: 8.9 % (ref 5–12)
NEUTROPHILS # BLD AUTO: 6.44 10*3/MM3 (ref 1.7–7)
NEUTROPHILS NFR BLD AUTO: 65.9 % (ref 42.7–76)
NUMBER OF DOSES: NORMAL
PLATELET # BLD AUTO: 195 10*3/MM3 (ref 140–450)
PMV BLD AUTO: 10.3 FL (ref 6–12)
RBC # BLD AUTO: 2.85 10*6/MM3 (ref 3.77–5.28)
RH BLD: NEGATIVE
WBC NRBC COR # BLD: 9.78 10*3/MM3 (ref 3.4–10.8)

## 2019-08-17 PROCEDURE — 25010000003 RHO D IMMUNE GLOBULIN 1500 UNITS SOLUTION PREFILLED SYRINGE: Performed by: OBSTETRICS & GYNECOLOGY

## 2019-08-17 PROCEDURE — 85461 HEMOGLOBIN FETAL: CPT | Performed by: OBSTETRICS & GYNECOLOGY

## 2019-08-17 PROCEDURE — 85025 COMPLETE CBC W/AUTO DIFF WBC: CPT | Performed by: OBSTETRICS & GYNECOLOGY

## 2019-08-17 PROCEDURE — 86901 BLOOD TYPING SEROLOGIC RH(D): CPT | Performed by: OBSTETRICS & GYNECOLOGY

## 2019-08-17 PROCEDURE — 86900 BLOOD TYPING SEROLOGIC ABO: CPT | Performed by: OBSTETRICS & GYNECOLOGY

## 2019-08-17 RX ADMIN — IBUPROFEN 800 MG: 800 TABLET, FILM COATED ORAL at 21:36

## 2019-08-17 RX ADMIN — DOCUSATE SODIUM 100 MG: 100 CAPSULE ORAL at 08:17

## 2019-08-17 RX ADMIN — IBUPROFEN 800 MG: 800 TABLET, FILM COATED ORAL at 08:17

## 2019-08-17 RX ADMIN — HUMAN RHO(D) IMMUNE GLOBULIN 1500 UNITS: 300 INJECTION, SOLUTION INTRAMUSCULAR at 15:55

## 2019-08-17 RX ADMIN — HYDROCODONE BITARTRATE AND ACETAMINOPHEN 1 TABLET: 5; 325 TABLET ORAL at 01:30

## 2019-08-17 RX ADMIN — HYDROCODONE BITARTRATE AND ACETAMINOPHEN 1 TABLET: 5; 325 TABLET ORAL at 08:18

## 2019-08-17 RX ADMIN — HYDROCODONE BITARTRATE AND ACETAMINOPHEN 1 TABLET: 5; 325 TABLET ORAL at 23:22

## 2019-08-17 RX ADMIN — IBUPROFEN 800 MG: 800 TABLET, FILM COATED ORAL at 15:55

## 2019-08-17 RX ADMIN — HYDROCODONE BITARTRATE AND ACETAMINOPHEN 1 TABLET: 5; 325 TABLET ORAL at 19:21

## 2019-08-17 NOTE — PLAN OF CARE
Problem: Patient Care Overview  Goal: Plan of Care Review  Outcome: Ongoing (interventions implemented as appropriate)   08/16/19 3978   Coping/Psychosocial   Plan of Care Reviewed With patient   Plan of Care Review   Progress improving       Problem: Postpartum (Vaginal Delivery) (Adult,Obstetrics,Pediatric)  Goal: Signs and Symptoms of Listed Potential Problems Will be Absent, Minimized or Managed (Postpartum)  Outcome: Ongoing (interventions implemented as appropriate)   08/16/19 9309   Goal/Outcome Evaluation   Problems Assessed (Postpartum Vaginal Delivery) all   Problems Present (Postpartum Vag Deliv) none

## 2019-08-17 NOTE — PAYOR COMM NOTE
"[facility]  JF BLACKMON  PHONE  [phone]  FAX  [phone]  NPI:  [id number]    DELIVERY NOTIFICATION  8/16/19 @ 1347  VAGINAL DELIVERY  MALE  APGARS:  8/9  WT:  2744 GMS  ICD:  O80  DR. VALVERDE  NPI:  [id number]    Ash Roach (19 y.o. Female)     Date of Birth Social Security Number Address Home Phone MRN    2000  [id number]   [facility] [address] [phone] [id number]    Zoroastrianism Marital Status          Muslim Single       Admission Date Admission Type Admitting Provider Attending Provider Department, Room/Bed    8/16/19 Urgent AncaBaldo MD Bevins, Rachel E, DO [facility], W234/1    Discharge Date Discharge Disposition Discharge Destination                       Attending Provider:  Ade Valverde DO    Allergies:  No Known Allergies    Isolation:  None   Infection:  None   Code Status:  CPR    Ht:  160 cm (63\")   Wt:  56.7 kg (125 lb)    Admission Cmt:  None   Principal Problem:  None                Active Insurance as of 8/16/2019     Primary Coverage     Payor Plan Insurance Group Employer/Plan Group    ANTHEM MEDICAID ANTHEM MEDICAID [id number]     Payor Plan Address Payor Plan Phone Number Payor Plan Fax Number Effective Dates    [address] [address] [phone]  7/2/2019 - None Entered    [facility] [address]       Subscriber Name Subscriber Birth Date Member ID       ASH ROACH 2000 [id number]                 Emergency Contacts      (Rel.) Home Phone Work Phone Mobile Phone    Rosemarie Berkowitz (Sister) [phone] -- --               History & Physical      Nav Wood III, MD at 8/16/2019  8:31 AM                Chief complaint   Chief Complaint   Patient presents with   • Scheduled Induction     PRESENTS VIA W/C TO L&D FOR JONATHAN IND OF LABOR D/T IUGR. STATES HAVING OCC CTX, DENIES VB OR LOF. STATES BABY IS MOVING WELL.        History of Present Illness: Patient is a 19 y.o. female who presents with IUP at " 37w6d weeks gestation. G 1, P 0. IUGR. PDC contacted. Recommend an IOL at 38 weeks gestation. EFW 3%      Past Medical History:   Diagnosis Date   • Anxiety    • Depression    • Urogenital trichomoniasis    + UDS THC    Blood Type: O +  Fetal Gender: Male  GBS: Negative  Past Surgical History:   Procedure Laterality Date   • EYE SURGERY Bilateral     GROWTH REMOVAL     Family History   Problem Relation Age of Onset   • Lung cancer Father    • No Known Problems Mother    • No Known Problems Brother    • Seizures Sister    • Hydrocephalus Sister    • No Known Problems Paternal Grandfather    • No Known Problems Paternal Grandmother    • Hypertension Maternal Grandmother    • Diabetes Maternal Grandmother    • Coronary artery disease Maternal Grandmother    • Alzheimer's disease Maternal Grandmother    • Lung cancer Maternal Grandfather      Social History     Tobacco Use   • Smoking status: Current Every Day Smoker     Packs/day: 1.00     Years: 5.00     Pack years: 5.00   • Smokeless tobacco: Never Used   Substance Use Topics   • Alcohol use: No     Frequency: Never   • Drug use: Yes     Types: Marijuana     Medications Prior to Admission   Medication Sig Dispense Refill Last Dose   • Prenatal Vit-Fe Fumarate-FA (PRENATAL VITAMIN 27-0.8) 27-0.8 MG tablet tablet Take 1 tablet by mouth Daily.   8/16/2019 at AM     Allergies:  Patient has no known allergies.      Vital Signs  Temp:  [96.4 °F (35.8 °C)] 96.4 °F (35.8 °C)  Heart Rate:  [73] 73  Resp:  [18] 18  BP: (119)/(73) 119/73    Radiology  Imaging Results (last 24 hours)     ** No results found for the last 24 hours. **          Labs  Lab Results (last 24 hours)     Procedure Component Value Units Date/Time    Urine Drug Screen - Urine, Clean Catch [[id number]]  (Normal) Collected:  08/16/19 0617    Specimen:  Urine, Clean Catch Updated:  08/16/19 0809     Amphetamine Screen, Urine Negative     Barbiturates Screen, Urine Negative     Benzodiazepine Screen, Urine  Negative     Cocaine Screen, Urine Negative     Methadone Screen, Urine Negative     Opiate Screen Negative     Phencyclidine (PCP), Urine Negative     THC, Screen, Urine Negative     6-ACETYL MORPHINE Negative     Buprenorphine, Screen, Urine Negative     Oxycodone Screen, Urine Negative    Narrative:       Negative Thresholds For Drugs Screened:                  Amphetamines              1000 ng/ml               Barbiturates               200 ng/ml               Benzodiazepines            200 ng/ml              Cocaine                    300 ng/ml              Methadone                  300 ng/ml              Opiates                    300 ng/ml               Phencyclidine               25 ng/ml               THC                         50 ng/ml              6-Acetyl Morphine           10 ng/ml              Buprenorphine                5 ng/ml              Oxycodone                  300 ng/ml    The reference range for all drugs tested is negative. This report includes final unconfirmed qualitative results to be used for medical treatment purposes only. Unconfirmed results must not be used for non-medical purposes such as employment or legal testing. Clinical consideration should be applied to any drug of abuse test, especially when unconfirmed quantitative results are used.      CBC & Differential [[id number]] Collected:  08/16/19 0634    Specimen:  Blood Updated:  08/16/19 3989    Narrative:       The following orders were created for panel order CBC & Differential.  Procedure                               Abnormality         Status                     ---------                               -----------         ------                     CBC Auto Differential[[id number]]        Abnormal            Final result                 Please view results for these tests on the individual orders.    CBC Auto Differential [[id number]]  (Abnormal) Collected:  08/16/19 0634    Specimen:  Blood Updated:  08/16/19 0751     WBC  11.43 10*3/mm3      RBC 3.69 10*6/mm3      Hemoglobin 12.4 g/dL      Hematocrit 36.1 %      MCV 97.8 fL      MCH 33.6 pg      MCHC 34.3 g/dL      RDW 12.8 %      RDW-SD 43.9 fl      MPV 10.7 fL      Platelets 226 10*3/mm3      Neutrophil % 72.0 %      Lymphocyte % 17.5 %      Monocyte % 7.9 %      Eosinophil % 1.0 %      Basophil % 0.1 %      Immature Grans % 1.5 %      Neutrophils, Absolute 8.23 10*3/mm3      Lymphocytes, Absolute 2.00 10*3/mm3      Monocytes, Absolute 0.90 10*3/mm3      Eosinophils, Absolute 0.12 10*3/mm3      Basophils, Absolute 0.01 10*3/mm3      Immature Grans, Absolute 0.17 10*3/mm3     Gonorrhea Screen - Swab, [[id number]] Resulted:  07/30/19     Specimen:  Swab Updated:  08/16/19 0647     External Gonorrhea Screen Negative    Hepatitis B Surface Antigen [[id number]] Resulted:  03/22/19     Specimen:  Blood Updated:  08/16/19 0647     External Hepatitis B Surface Ag Negative    RPR [[id number]] Resulted:  03/22/19     Specimen:  Blood Updated:  08/16/19 0647     External RPR Non-Reactive    Rubella Antibody, IgG [[id number]] Resulted:  03/22/19     Specimen:  Blood Updated:  08/16/19 0647     External Rubella Qual Immune    HIV-1 Antibody, EIA [[id number]] Resulted:  03/22/19     Specimen:  Blood Updated:  08/16/19 0647     External HIV Antibody Non-Reactive    Group B Streptococcus Culture - Swab, Vaginal/Rectum [[id number]] Resulted:  08/07/19     Specimen:  Swab from Vaginal/Rectum Updated:  08/16/19 0647     External Strep Group B Ag Negative    Chlamydia trachomatis, Neisseria gonorrhoeae, PCR w/ confirmation - Swab, Vagina [[id number]] Resulted:  07/30/19     Specimen:  Swab from Vagina Updated:  08/16/19 0647     External Chlamydia Screen Negative            Review of Systems    The following systems were reviewed and negative;  ENT, respiratory, cardiovascular, gastrointestinal, genitourinary, breast, endocrine and allergies / immunologic.      Physical Exam:      General Appearance:     Alert, cooperative, in no acute distress   Head:    Normocephalic, without obvious abnormality, atraumatic   Eyes:            Lids and lashes normal, conjunctivae and sclerae normal, no   icterus, no pallor, corneas clear, PERRLA   Ears:    Ears appear intact with no abnormalities noted   Throat:   No oral lesions, no thrush, oral mucosa moist   Neck:   No adenopathy, supple, trachea midline, no thyromegaly, no     carotid bruit, no JVD   Back:     No kyphosis present, no scoliosis present, no skin lesions,       erythema or scars, no tenderness to percussion or                   palpation,   range of motion normal   Lungs:     Clear to auscultation,respirations regular, even and                   unlabored    Heart:    Regular rhythm and normal rate, normal S1 and S2, no            murmur, no gallop, no rub, no click   Breast Exam:    Deferred   Abdomen:     Normal bowel sounds, no masses, no organomegaly, soft        non-tender, non-distended, no guarding, no rebound                 tenderness   Genitalia:    Deferred   Extremities:   Moves all extremities well, no edema, no cyanosis, no              redness   Pulses:   Pulses palpable and equal bilaterally   Skin:   No bleeding, bruising or rash   Lymph nodes:   No palpable adenopathy   Neurologic:   Cranial nerves 2 - 12 grossly intact, sensation intact, DTR        present and equal bilaterally         Assessment: Patient is a 19 y.o. female who presents with IUP at 37w6d weeks gestation. G 1, P 0. IUGR. EFW 3%. PDC consulted. Recommend IOL at 38 weeks gestation.   Chief Complaint   Patient presents with   • Scheduled Induction     PRESENTS VIA W/C TO L&D FOR [facility]ATHAN IND OF LABOR D/T IUGR. STATES HAVING OCC CTX, DENIES VB OR LOF. STATES BABY IS MOVING WELL.        Plan of Care: Admit. Proceed with an IOL per PDC.      Nav Wood III, MD  08/16/19  8:32 AM      Electronically signed by Nav Wood III, MD at 8/16/2019  8:35 AM     Baldo March MD at  8/16/2019 12:55 AM                                                                         HISTORY    Chief complaint  In for induction secondary to IUGR    History of present illness  This is a 27-year old G1, P0 patient at 37 weeks +6 days, a patient of Dr. Bennett.  She had a 14-week ultrasound that showed an EDC of August 31, 2019.  She has IUGR at the 3rd percentile and the chart says that PDC was consulted, and they recommended delivery at 38 weeks.    Past medical, surgical, obstetrical history  See attached prenatal record    Family history  See attached prenatal record    Social history  See attached prenatal record    Functional inquiry  See attached prenatal record                                                   PHYSICAL EXAMINATION    General  In no acute distress    HEENT  Throat and ears are clear, no masses or nodes were palpable    CVR  Heart sounds are normal with no murmurs, chest is clear to IPPA    GI/  Abdomen is soft with no masses tenderness or organomegaly.  Uterine size is commensurate with her dates.  Cervix is as noted elsewhere in chart    MS  No gross bone or joint abnormalities.                                                                    IMPRESSION    38 weeks, IUGR.                                                                TREATMENT PLAN    I ruptured her membranes for clear amniotic fluid.  She is about 3-4, 90%.  We will start Pitocin.  Expect spontaneous vaginal delivery.  Deanna Cuello, July 8, 2000    Electronically signed by Baldo aMrch MD at 8/16/2019 10:53 AM       Physician Progress Notes (last 24 hours) (Notes from 8/16/2019  8:02 AM through 8/17/2019  8:02 AM)     No notes of this type exist for this encounter.

## 2019-08-17 NOTE — PROGRESS NOTES
This patient underwent vaginal delivery yesterday August 16, 2019.  I was the delivering doctor.  We did a gentle vacuum.  August 17 hemoglobin is 9.3, white count 9000, most recent vital signs normal.  She says that her baby has mild jaundice.  We will look toward dismissal tomorrow.  Deanna Cuello, July 8, 2000

## 2019-08-18 VITALS
SYSTOLIC BLOOD PRESSURE: 95 MMHG | OXYGEN SATURATION: 98 % | WEIGHT: 125 LBS | DIASTOLIC BLOOD PRESSURE: 48 MMHG | TEMPERATURE: 97.9 F | RESPIRATION RATE: 18 BRPM | BODY MASS INDEX: 22.15 KG/M2 | HEART RATE: 58 BPM | HEIGHT: 63 IN

## 2019-08-18 RX ORDER — DOCUSATE SODIUM 100 MG/1
100 CAPSULE, LIQUID FILLED ORAL 2 TIMES DAILY
Qty: 60 CAPSULE | Refills: 0 | Status: SHIPPED | OUTPATIENT
Start: 2019-08-18 | End: 2019-09-17

## 2019-08-18 RX ORDER — IBUPROFEN 600 MG/1
600 TABLET ORAL EVERY 6 HOURS PRN
Qty: 30 TABLET | Refills: 0 | Status: SHIPPED | OUTPATIENT
Start: 2019-08-18 | End: 2019-09-17

## 2019-08-18 RX ADMIN — HYDROCODONE BITARTRATE AND ACETAMINOPHEN 1 TABLET: 5; 325 TABLET ORAL at 07:37

## 2019-08-18 RX ADMIN — HYDROCODONE BITARTRATE AND ACETAMINOPHEN 1 TABLET: 5; 325 TABLET ORAL at 03:22

## 2019-08-18 RX ADMIN — IBUPROFEN 800 MG: 800 TABLET, FILM COATED ORAL at 08:28

## 2019-08-18 RX ADMIN — DOCUSATE SODIUM 100 MG: 100 CAPSULE ORAL at 08:28

## 2019-08-18 NOTE — DISCHARGE SUMMARY
Discharge Summary    Reason for Hospitalization:  See below    Admission and Discharge Diagnoses:  See below    Course in Hospital:  This patient underwent vaginal delivery August 16, 2019.  She was delivered because of IUGR.  Baby was at the 3rd percentile.  She required a gentle vacuum delivery.  Her postpartum hemoglobin is okay, vital signs are normal.  We are going to dismiss her with ibuprofen, Colace, and she will come see us in the office in 2 weeks in follow-up.    Discharge Condition:  Stable.    Discharge Instructions and Plans for Follow-Up:  As above    Discharge Prescriptions:  As above  Deanna Cuello, July 8, 2000

## 2019-08-18 NOTE — DISCHARGE INSTR - APPOINTMENTS
Please call [facility]'s Care at [phone] as soon as possible to schedule an appointment with Dr. Bennett.  Tell the  that you need to schedule you two-week postpartum follow-up visit.

## 2019-08-18 NOTE — PLAN OF CARE
Problem: Patient Care Overview  Goal: Plan of Care Review  Outcome: Ongoing (interventions implemented as appropriate)   08/18/19 3303   Coping/Psychosocial   Plan of Care Reviewed With patient;significant other   Plan of Care Review   Progress improving   OTHER   Outcome Summary Ambulating and voiding without difficulty.      Goal: Individualization and Mutuality  Outcome: Ongoing (interventions implemented as appropriate)    Goal: Discharge Needs Assessment  Outcome: Ongoing (interventions implemented as appropriate)      Problem: Postpartum (Vaginal Delivery) (Adult,Obstetrics,Pediatric)  Goal: Signs and Symptoms of Listed Potential Problems Will be Absent, Minimized or Managed (Postpartum)  Outcome: Ongoing (interventions implemented as appropriate)

## 2019-08-19 NOTE — PAYOR COMM NOTE
"CONTACT:  CRISTINA MACIEL MSN, RN  UTILIZATION MANAGEMENT DEPT.  [facility]  [address], [address]  PHONE:  [phone]  FAX: [phone]    PATIENT DISCHARGED TO HOME ON 8/18/19.    REFER TO AUTH # [id number]    Ash Roach (19 y.o. Female)     Date of Birth Social Security Number Address Home Phone MRN    2000  [facility]   [facility] [address] [phone] [id number]    Orthodoxy Marital Status          Anabaptism Single       Admission Date Admission Type Admitting Provider Attending Provider Department, Room/Bed    8/16/19 Urgent Baldo March MD  [facility], W234/1    Discharge Date Discharge Disposition Discharge Destination        8/18/2019 Home or Self Care              Attending Provider:  (none)   Allergies:  No Known Allergies    Isolation:  None   Infection:  None   Code Status:  Prior    Ht:  160 cm (63\")   Wt:  56.7 kg (125 lb)    Admission Cmt:  None   Principal Problem:  None                Active Insurance as of 8/16/2019     Primary Coverage     Payor Plan Insurance Group Employer/Plan Group    ANTHEM MEDICAID ANTHEM MEDICAID [id number]     Payor Plan Address Payor Plan Phone Number Payor Plan Fax Number Effective Dates    [address] [address] [phone]  7/2/2019 - None Entered    [facility] [id number]       Subscriber Name Subscriber Birth Date Member ID       ASH ROACH 2000 [id number]                 Emergency Contacts      (Rel.) Home Phone Work Phone Mobile Phone    Rosemarie Berkowitz (Sister) [phone] -- --               Discharge Summary      Baldo March MD at 8/18/2019 10:17 AM        Discharge Summary    Reason for Hospitalization:  See below    Admission and Discharge Diagnoses:  See below    Course in Hospital:  This patient underwent vaginal delivery August 16, 2019.  She was delivered because of IUGR.  Baby was at the 3rd percentile.  She required a gentle vacuum delivery.  Her postpartum " hemoglobin is okay, vital signs are normal.  We are going to dismiss her with ibuprofen, Colace, and she will come see us in the office in 2 weeks in follow-up.    Discharge Condition:  Stable.    Discharge Instructions and Plans for Follow-Up:  As above    Discharge Prescriptions:  As above  Deannapeter Cuello, July 8, 2000    Electronically signed by Baldo March MD at 8/18/2019 10:19 AM

## 2021-01-19 LAB
EXTERNAL ABO GROUPING: NORMAL
EXTERNAL ANTIBODY SCREEN: NEGATIVE
EXTERNAL CHLAMYDIA SCREEN: NEGATIVE
EXTERNAL GONORRHEA SCREEN: NEGATIVE
EXTERNAL GTT 1 HOUR: 62
EXTERNAL HEPATITIS B SURFACE ANTIGEN: NEGATIVE
EXTERNAL RH FACTOR: NEGATIVE
EXTERNAL RUBELLA QUALITATIVE: NORMAL
EXTERNAL SYPHILIS RPR SCREEN: NORMAL
HIV1 P24 AG SERPL QL IA: NORMAL

## 2021-01-29 LAB
EXTERNAL CHLAMYDIA SCREEN: NORMAL
EXTERNAL GONORRHEA SCREEN: NORMAL

## 2021-02-25 ENCOUNTER — HOSPITAL ENCOUNTER (OUTPATIENT)
Facility: HOSPITAL | Age: 21
Discharge: HOME | End: 2021-02-25
Attending: OBSTETRICS & GYNECOLOGY | Admitting: OBSTETRICS & GYNECOLOGY

## 2021-02-25 VITALS
HEART RATE: 107 BPM | HEIGHT: 62 IN | DIASTOLIC BLOOD PRESSURE: 68 MMHG | SYSTOLIC BLOOD PRESSURE: 118 MMHG | BODY MASS INDEX: 22.73 KG/M2 | RESPIRATION RATE: 18 BRPM | TEMPERATURE: 98.8 F | WEIGHT: 123.5 LBS

## 2021-02-25 PROBLEM — Z34.90 PREGNANCY: Status: ACTIVE | Noted: 2021-02-25

## 2021-02-25 LAB
6-ACETYL MORPHINE: NEGATIVE
ALBUMIN SERPL-MCNC: 3.93 G/DL (ref 3.5–5.2)
ALBUMIN/GLOB SERPL: 1.2 G/DL
ALP SERPL-CCNC: 193 U/L (ref 39–117)
ALT SERPL W P-5'-P-CCNC: 7 U/L (ref 1–33)
AMPHET+METHAMPHET UR QL: NEGATIVE
ANION GAP SERPL CALCULATED.3IONS-SCNC: 9.7 MMOL/L (ref 5–15)
AST SERPL-CCNC: 12 U/L (ref 1–32)
BACTERIA UR QL AUTO: ABNORMAL /HPF
BARBITURATES UR QL SCN: NEGATIVE
BASOPHILS # BLD AUTO: 0.02 10*3/MM3 (ref 0–0.2)
BASOPHILS NFR BLD AUTO: 0.2 % (ref 0–1.5)
BENZODIAZ UR QL SCN: NEGATIVE
BILIRUB SERPL-MCNC: 0.4 MG/DL (ref 0–1.2)
BILIRUB UR QL STRIP: ABNORMAL
BUN SERPL-MCNC: 7 MG/DL (ref 6–20)
BUN/CREAT SERPL: 17.9 (ref 7–25)
BUPRENORPHINE SERPL-MCNC: NEGATIVE NG/ML
CALCIUM SPEC-SCNC: 9.2 MG/DL (ref 8.6–10.5)
CANNABINOIDS SERPL QL: POSITIVE
CHLORIDE SERPL-SCNC: 103 MMOL/L (ref 98–107)
CLARITY UR: ABNORMAL
CO2 SERPL-SCNC: 22.3 MMOL/L (ref 22–29)
COCAINE UR QL: NEGATIVE
COLOR UR: ABNORMAL
CREAT SERPL-MCNC: 0.39 MG/DL (ref 0.57–1)
DEPRECATED RDW RBC AUTO: 43.9 FL (ref 37–54)
EOSINOPHIL # BLD AUTO: 0.05 10*3/MM3 (ref 0–0.4)
EOSINOPHIL NFR BLD AUTO: 0.5 % (ref 0.3–6.2)
ERYTHROCYTE [DISTWIDTH] IN BLOOD BY AUTOMATED COUNT: 12.3 % (ref 12.3–15.4)
GFR SERPL CREATININE-BSD FRML MDRD: >150 ML/MIN/1.73
GLOBULIN UR ELPH-MCNC: 3.2 GM/DL
GLUCOSE SERPL-MCNC: 78 MG/DL (ref 65–99)
GLUCOSE UR STRIP-MCNC: NEGATIVE MG/DL
HCT VFR BLD AUTO: 35.7 % (ref 34–46.6)
HGB BLD-MCNC: 11.9 G/DL (ref 12–15.9)
HGB UR QL STRIP.AUTO: NEGATIVE
HYALINE CASTS UR QL AUTO: ABNORMAL /LPF
IMM GRANULOCYTES # BLD AUTO: 0.14 10*3/MM3 (ref 0–0.05)
IMM GRANULOCYTES NFR BLD AUTO: 1.5 % (ref 0–0.5)
KETONES UR QL STRIP: ABNORMAL
LEUKOCYTE ESTERASE UR QL STRIP.AUTO: ABNORMAL
LYMPHOCYTES # BLD AUTO: 1.65 10*3/MM3 (ref 0.7–3.1)
LYMPHOCYTES NFR BLD AUTO: 17.4 % (ref 19.6–45.3)
MCH RBC QN AUTO: 32.3 PG (ref 26.6–33)
MCHC RBC AUTO-ENTMCNC: 33.3 G/DL (ref 31.5–35.7)
MCV RBC AUTO: 97 FL (ref 79–97)
METHADONE UR QL SCN: NEGATIVE
MONOCYTES # BLD AUTO: 0.55 10*3/MM3 (ref 0.1–0.9)
MONOCYTES NFR BLD AUTO: 5.8 % (ref 5–12)
NEUTROPHILS NFR BLD AUTO: 7.08 10*3/MM3 (ref 1.7–7)
NEUTROPHILS NFR BLD AUTO: 74.6 % (ref 42.7–76)
NITRITE UR QL STRIP: NEGATIVE
NRBC BLD AUTO-RTO: 0 /100 WBC (ref 0–0.2)
OPIATES UR QL: NEGATIVE
OXYCODONE UR QL SCN: NEGATIVE
PCP UR QL SCN: NEGATIVE
PH UR STRIP.AUTO: 6.5 [PH] (ref 5–8)
PLATELET # BLD AUTO: 221 10*3/MM3 (ref 140–450)
PMV BLD AUTO: 10.2 FL (ref 6–12)
POTASSIUM SERPL-SCNC: 3.8 MMOL/L (ref 3.5–5.2)
PROT SERPL-MCNC: 7.1 G/DL (ref 6–8.5)
PROT UR QL STRIP: ABNORMAL
RBC # BLD AUTO: 3.68 10*6/MM3 (ref 3.77–5.28)
RBC # UR: ABNORMAL /HPF
REF LAB TEST METHOD: ABNORMAL
SODIUM SERPL-SCNC: 135 MMOL/L (ref 136–145)
SP GR UR STRIP: 1.03 (ref 1–1.03)
SQUAMOUS #/AREA URNS HPF: ABNORMAL /HPF
UROBILINOGEN UR QL STRIP: ABNORMAL
WBC # BLD AUTO: 9.49 10*3/MM3 (ref 3.4–10.8)
WBC UR QL AUTO: ABNORMAL /HPF

## 2021-02-25 PROCEDURE — 80307 DRUG TEST PRSMV CHEM ANLYZR: CPT | Performed by: OBSTETRICS & GYNECOLOGY

## 2021-02-25 PROCEDURE — 85025 COMPLETE CBC W/AUTO DIFF WBC: CPT | Performed by: OBSTETRICS & GYNECOLOGY

## 2021-02-25 PROCEDURE — G0463 HOSPITAL OUTPT CLINIC VISIT: HCPCS

## 2021-02-25 PROCEDURE — 80053 COMPREHEN METABOLIC PANEL: CPT | Performed by: OBSTETRICS & GYNECOLOGY

## 2021-02-25 PROCEDURE — 87086 URINE CULTURE/COLONY COUNT: CPT | Performed by: OBSTETRICS & GYNECOLOGY

## 2021-02-25 PROCEDURE — 81001 URINALYSIS AUTO W/SCOPE: CPT | Performed by: OBSTETRICS & GYNECOLOGY

## 2021-02-25 PROCEDURE — 25010000002 BETAMETHASONE ACET & SOD PHOS PER 4 MG: Performed by: OBSTETRICS & GYNECOLOGY

## 2021-02-25 PROCEDURE — 59025 FETAL NON-STRESS TEST: CPT

## 2021-02-25 PROCEDURE — 96372 THER/PROPH/DIAG INJ SC/IM: CPT

## 2021-02-25 PROCEDURE — 36415 COLL VENOUS BLD VENIPUNCTURE: CPT | Performed by: OBSTETRICS & GYNECOLOGY

## 2021-02-25 RX ORDER — BETAMETHASONE SODIUM PHOSPHATE AND BETAMETHASONE ACETATE 3; 3 MG/ML; MG/ML
12 INJECTION, SUSPENSION INTRA-ARTICULAR; INTRALESIONAL; INTRAMUSCULAR; SOFT TISSUE EVERY 24 HOURS
Status: DISCONTINUED | OUTPATIENT
Start: 2021-02-25 | End: 2021-02-25 | Stop reason: HOSPADM

## 2021-02-25 RX ADMIN — BETAMETHASONE ACETATE AND BETAMETHASONE SODIUM PHOSPHATE 12 MG: 3; 3 INJECTION, SUSPENSION INTRA-ARTICULAR; INTRALESIONAL; INTRAMUSCULAR; SOFT TISSUE at 20:30

## 2021-02-26 ENCOUNTER — HOSPITAL ENCOUNTER (OUTPATIENT)
Facility: HOSPITAL | Age: 21
Discharge: HOME | End: 2021-02-26
Attending: OBSTETRICS & GYNECOLOGY | Admitting: OBSTETRICS & GYNECOLOGY

## 2021-02-26 ENCOUNTER — HOSPITAL ENCOUNTER (OUTPATIENT)
Facility: HOSPITAL | Age: 21
End: 2021-02-26
Attending: OBSTETRICS & GYNECOLOGY | Admitting: OBSTETRICS & GYNECOLOGY

## 2021-02-26 PROBLEM — Z34.90 PREGNANT: Status: ACTIVE | Noted: 2021-02-26

## 2021-02-26 LAB — BACTERIA SPEC AEROBE CULT: NO GROWTH

## 2021-02-26 PROCEDURE — G0463 HOSPITAL OUTPT CLINIC VISIT: HCPCS

## 2021-02-26 PROCEDURE — 96372 THER/PROPH/DIAG INJ SC/IM: CPT

## 2021-02-26 PROCEDURE — 25010000002 BETAMETHASONE ACET & SOD PHOS PER 4 MG

## 2021-02-26 PROCEDURE — 59025 FETAL NON-STRESS TEST: CPT

## 2021-02-26 RX ORDER — BETAMETHASONE SODIUM PHOSPHATE AND BETAMETHASONE ACETATE 3; 3 MG/ML; MG/ML
12 INJECTION, SUSPENSION INTRA-ARTICULAR; INTRALESIONAL; INTRAMUSCULAR; SOFT TISSUE EVERY 24 HOURS
Status: DISCONTINUED | OUTPATIENT
Start: 2021-02-26 | End: 2021-02-26 | Stop reason: HOSPADM

## 2021-02-26 RX ORDER — BETAMETHASONE SODIUM PHOSPHATE AND BETAMETHASONE ACETATE 3; 3 MG/ML; MG/ML
INJECTION, SUSPENSION INTRA-ARTICULAR; INTRALESIONAL; INTRAMUSCULAR; SOFT TISSUE
Status: COMPLETED
Start: 2021-02-26 | End: 2021-02-26

## 2021-02-26 RX ADMIN — BETAMETHASONE SODIUM PHOSPHATE AND BETAMETHASONE ACETATE 12 MG: 3; 3 INJECTION, SUSPENSION INTRA-ARTICULAR; INTRALESIONAL; INTRAMUSCULAR; SOFT TISSUE at 18:34

## 2021-02-26 RX ADMIN — BETAMETHASONE ACETATE AND BETAMETHASONE SODIUM PHOSPHATE 12 MG: 3; 3 INJECTION, SUSPENSION INTRA-ARTICULAR; INTRALESIONAL; INTRAMUSCULAR; SOFT TISSUE at 18:34

## 2021-03-10 LAB — EXTERNAL GROUP B STREP ANTIGEN: NORMAL

## 2021-03-18 ENCOUNTER — HOSPITAL ENCOUNTER (INPATIENT)
Facility: HOSPITAL | Age: 21
LOS: 3 days | Discharge: HOME | End: 2021-03-21
Attending: OBSTETRICS & GYNECOLOGY | Admitting: OBSTETRICS & GYNECOLOGY

## 2021-03-18 LAB
6-ACETYL MORPHINE: NEGATIVE
ABO GROUP BLD: NORMAL
AMPHET+METHAMPHET UR QL: NEGATIVE
BARBITURATES UR QL SCN: NEGATIVE
BASOPHILS # BLD AUTO: 0.02 10*3/MM3 (ref 0–0.2)
BASOPHILS NFR BLD AUTO: 0.2 % (ref 0–1.5)
BENZODIAZ UR QL SCN: NEGATIVE
BLD GP AB SCN SERPL QL: POSITIVE
BUPRENORPHINE SERPL-MCNC: NEGATIVE NG/ML
CANNABINOIDS SERPL QL: POSITIVE
COCAINE UR QL: NEGATIVE
DEPRECATED RDW RBC AUTO: 42.9 FL (ref 37–54)
EOSINOPHIL # BLD AUTO: 0.07 10*3/MM3 (ref 0–0.4)
EOSINOPHIL NFR BLD AUTO: 0.7 % (ref 0.3–6.2)
ERYTHROCYTE [DISTWIDTH] IN BLOOD BY AUTOMATED COUNT: 12.5 % (ref 12.3–15.4)
HCT VFR BLD AUTO: 36.8 % (ref 34–46.6)
HGB BLD-MCNC: 12.6 G/DL (ref 12–15.9)
IMM GRANULOCYTES # BLD AUTO: 0.09 10*3/MM3 (ref 0–0.05)
IMM GRANULOCYTES NFR BLD AUTO: 1 % (ref 0–0.5)
LYMPHOCYTES # BLD AUTO: 1.59 10*3/MM3 (ref 0.7–3.1)
LYMPHOCYTES NFR BLD AUTO: 16.8 % (ref 19.6–45.3)
MCH RBC QN AUTO: 32.4 PG (ref 26.6–33)
MCHC RBC AUTO-ENTMCNC: 34.2 G/DL (ref 31.5–35.7)
MCV RBC AUTO: 94.6 FL (ref 79–97)
METHADONE UR QL SCN: NEGATIVE
MONOCYTES # BLD AUTO: 0.53 10*3/MM3 (ref 0.1–0.9)
MONOCYTES NFR BLD AUTO: 5.6 % (ref 5–12)
NEUTROPHILS NFR BLD AUTO: 7.15 10*3/MM3 (ref 1.7–7)
NEUTROPHILS NFR BLD AUTO: 75.7 % (ref 42.7–76)
NRBC BLD AUTO-RTO: 0 /100 WBC (ref 0–0.2)
OPIATES UR QL: NEGATIVE
OXYCODONE UR QL SCN: NEGATIVE
PCP UR QL SCN: NEGATIVE
PLATELET # BLD AUTO: 212 10*3/MM3 (ref 140–450)
PMV BLD AUTO: 10.5 FL (ref 6–12)
RBC # BLD AUTO: 3.89 10*6/MM3 (ref 3.77–5.28)
RESIDUAL RHIG DETECTED: NORMAL
RH BLD: NEGATIVE
SARS-COV-2 RDRP RESP QL NAA+PROBE: NORMAL
T&S EXPIRATION DATE: NORMAL
WBC # BLD AUTO: 9.45 10*3/MM3 (ref 3.4–10.8)

## 2021-03-18 PROCEDURE — 80307 DRUG TEST PRSMV CHEM ANLYZR: CPT | Performed by: OBSTETRICS & GYNECOLOGY

## 2021-03-18 PROCEDURE — 86901 BLOOD TYPING SEROLOGIC RH(D): CPT | Performed by: OBSTETRICS & GYNECOLOGY

## 2021-03-18 PROCEDURE — 85025 COMPLETE CBC W/AUTO DIFF WBC: CPT | Performed by: OBSTETRICS & GYNECOLOGY

## 2021-03-18 PROCEDURE — 86870 RBC ANTIBODY IDENTIFICATION: CPT | Performed by: OBSTETRICS & GYNECOLOGY

## 2021-03-18 PROCEDURE — 86900 BLOOD TYPING SEROLOGIC ABO: CPT | Performed by: OBSTETRICS & GYNECOLOGY

## 2021-03-18 PROCEDURE — 87635 SARS-COV-2 COVID-19 AMP PRB: CPT | Performed by: OBSTETRICS & GYNECOLOGY

## 2021-03-18 PROCEDURE — 86850 RBC ANTIBODY SCREEN: CPT | Performed by: OBSTETRICS & GYNECOLOGY

## 2021-03-18 RX ORDER — SODIUM CHLORIDE, SODIUM LACTATE, POTASSIUM CHLORIDE, CALCIUM CHLORIDE 600; 310; 30; 20 MG/100ML; MG/100ML; MG/100ML; MG/100ML
125 INJECTION, SOLUTION INTRAVENOUS CONTINUOUS
Status: DISCONTINUED | OUTPATIENT
Start: 2021-03-18 | End: 2021-03-19

## 2021-03-18 RX ORDER — SODIUM CHLORIDE 0.9 % (FLUSH) 0.9 %
3-10 SYRINGE (ML) INJECTION AS NEEDED
Status: DISCONTINUED | OUTPATIENT
Start: 2021-03-18 | End: 2021-03-19 | Stop reason: HOSPADM

## 2021-03-18 RX ORDER — BUTORPHANOL TARTRATE 1 MG/ML
1 INJECTION, SOLUTION INTRAMUSCULAR; INTRAVENOUS
Status: DISCONTINUED | OUTPATIENT
Start: 2021-03-18 | End: 2021-03-19 | Stop reason: HOSPADM

## 2021-03-18 RX ORDER — MAGNESIUM HYDROXIDE 1200 MG/15ML
1000 LIQUID ORAL ONCE AS NEEDED
Status: DISCONTINUED | OUTPATIENT
Start: 2021-03-18 | End: 2021-03-19 | Stop reason: HOSPADM

## 2021-03-18 RX ORDER — OXYTOCIN-SODIUM CHLORIDE 0.9% IV SOLN 30 UNIT/500ML 30-0.9/5 UT/ML-%
2-20 SOLUTION INTRAVENOUS
Status: DISCONTINUED | OUTPATIENT
Start: 2021-03-18 | End: 2021-03-19 | Stop reason: HOSPADM

## 2021-03-18 RX ORDER — ONDANSETRON 2 MG/ML
4 INJECTION INTRAMUSCULAR; INTRAVENOUS EVERY 6 HOURS PRN
Status: DISCONTINUED | OUTPATIENT
Start: 2021-03-18 | End: 2021-03-19 | Stop reason: HOSPADM

## 2021-03-18 RX ORDER — ACETAMINOPHEN 325 MG/1
650 TABLET ORAL EVERY 4 HOURS PRN
Status: DISCONTINUED | OUTPATIENT
Start: 2021-03-18 | End: 2021-03-19 | Stop reason: HOSPADM

## 2021-03-18 RX ORDER — ONDANSETRON 4 MG/1
4 TABLET, FILM COATED ORAL EVERY 6 HOURS PRN
Status: DISCONTINUED | OUTPATIENT
Start: 2021-03-18 | End: 2021-03-19 | Stop reason: HOSPADM

## 2021-03-18 RX ORDER — TERBUTALINE SULFATE 1 MG/ML
0.25 INJECTION, SOLUTION SUBCUTANEOUS AS NEEDED
Status: DISCONTINUED | OUTPATIENT
Start: 2021-03-18 | End: 2021-03-19 | Stop reason: HOSPADM

## 2021-03-18 RX ADMIN — SODIUM CHLORIDE, POTASSIUM CHLORIDE, SODIUM LACTATE AND CALCIUM CHLORIDE 1000 ML: 600; 310; 30; 20 INJECTION, SOLUTION INTRAVENOUS at 16:43

## 2021-03-18 NOTE — NON STRESS TEST
Deanna Cuello, a  at 37w0d with an PRESLEY of 2021, by Ultrasound, was seen at [facility] LABOR DELIVERY for a nonstress test.    Chief Complaint   Patient presents with   • Laboring       Patient Active Problem List   Diagnosis   • IUGR (intrauterine growth restriction) affecting care of mother, third trimester, fetus 1   • Pregnancy   • Pregnant       Start Time: 1645  Stop Time: 1700    Interpretation A  Nonstress Test Interpretation A: Reactive (21 : Meghann Lazcano, RN)  Comments A: confirmed with NOAH Raygoza RN (21 : Meghann Lazcano, RN)

## 2021-03-19 ENCOUNTER — ANESTHESIA EVENT (OUTPATIENT)
Dept: LABOR AND DELIVERY | Facility: HOSPITAL | Age: 21
End: 2021-03-19

## 2021-03-19 ENCOUNTER — ANESTHESIA (OUTPATIENT)
Dept: LABOR AND DELIVERY | Facility: HOSPITAL | Age: 21
End: 2021-03-19

## 2021-03-19 PROCEDURE — 10907ZC DRAINAGE OF AMNIOTIC FLUID, THERAPEUTIC FROM PRODUCTS OF CONCEPTION, VIA NATURAL OR ARTIFICIAL OPENING: ICD-10-PCS | Performed by: OBSTETRICS & GYNECOLOGY

## 2021-03-19 PROCEDURE — 59025 FETAL NON-STRESS TEST: CPT

## 2021-03-19 PROCEDURE — 25010000002 ONDANSETRON PER 1 MG: Performed by: OBSTETRICS & GYNECOLOGY

## 2021-03-19 PROCEDURE — C1755 CATHETER, INTRASPINAL: HCPCS

## 2021-03-19 PROCEDURE — C1755 CATHETER, INTRASPINAL: HCPCS | Performed by: ANESTHESIOLOGY

## 2021-03-19 RX ORDER — ACETAMINOPHEN 325 MG/1
650 TABLET ORAL EVERY 4 HOURS PRN
Status: DISCONTINUED | OUTPATIENT
Start: 2021-03-19 | End: 2021-03-19 | Stop reason: HOSPADM

## 2021-03-19 RX ORDER — METHYLERGONOVINE MALEATE 0.2 MG/ML
200 INJECTION INTRAVENOUS ONCE AS NEEDED
Status: DISCONTINUED | OUTPATIENT
Start: 2021-03-19 | End: 2021-03-21 | Stop reason: HOSPADM

## 2021-03-19 RX ORDER — CARBOPROST TROMETHAMINE 250 UG/ML
250 INJECTION, SOLUTION INTRAMUSCULAR ONCE AS NEEDED
Status: DISCONTINUED | OUTPATIENT
Start: 2021-03-19 | End: 2021-03-21 | Stop reason: HOSPADM

## 2021-03-19 RX ORDER — FAMOTIDINE 10 MG/ML
20 INJECTION, SOLUTION INTRAVENOUS ONCE AS NEEDED
Status: DISCONTINUED | OUTPATIENT
Start: 2021-03-19 | End: 2021-03-19 | Stop reason: HOSPADM

## 2021-03-19 RX ORDER — HYDROXYZINE 50 MG/1
50 TABLET, FILM COATED ORAL NIGHTLY PRN
Status: DISCONTINUED | OUTPATIENT
Start: 2021-03-19 | End: 2021-03-21 | Stop reason: HOSPADM

## 2021-03-19 RX ORDER — MISOPROSTOL 100 UG/1
600 TABLET ORAL ONCE AS NEEDED
Status: DISCONTINUED | OUTPATIENT
Start: 2021-03-19 | End: 2021-03-21 | Stop reason: HOSPADM

## 2021-03-19 RX ORDER — ONDANSETRON 2 MG/ML
4 INJECTION INTRAMUSCULAR; INTRAVENOUS EVERY 6 HOURS PRN
Status: DISCONTINUED | OUTPATIENT
Start: 2021-03-19 | End: 2021-03-21 | Stop reason: HOSPADM

## 2021-03-19 RX ORDER — HYDROCODONE BITARTRATE AND ACETAMINOPHEN 5; 325 MG/1; MG/1
1 TABLET ORAL EVERY 4 HOURS PRN
Status: DISCONTINUED | OUTPATIENT
Start: 2021-03-19 | End: 2021-03-21 | Stop reason: HOSPADM

## 2021-03-19 RX ORDER — HYDROCODONE BITARTRATE AND ACETAMINOPHEN 7.5; 325 MG/1; MG/1
1 TABLET ORAL EVERY 4 HOURS PRN
Status: DISCONTINUED | OUTPATIENT
Start: 2021-03-19 | End: 2021-03-19 | Stop reason: HOSPADM

## 2021-03-19 RX ORDER — ROPIVACAINE HYDROCHLORIDE 2 MG/ML
INJECTION, SOLUTION EPIDURAL; INFILTRATION; PERINEURAL
Status: DISCONTINUED
Start: 2021-03-19 | End: 2021-03-21 | Stop reason: HOSPADM

## 2021-03-19 RX ORDER — HYDROCORTISONE 25 MG/G
1 CREAM TOPICAL AS NEEDED
Status: DISCONTINUED | OUTPATIENT
Start: 2021-03-19 | End: 2021-03-21 | Stop reason: HOSPADM

## 2021-03-19 RX ORDER — IBUPROFEN 800 MG/1
800 TABLET ORAL EVERY 8 HOURS SCHEDULED
Status: DISCONTINUED | OUTPATIENT
Start: 2021-03-19 | End: 2021-03-21 | Stop reason: HOSPADM

## 2021-03-19 RX ORDER — BUPIVACAINE HYDROCHLORIDE 2.5 MG/ML
INJECTION, SOLUTION EPIDURAL; INFILTRATION; INTRACAUDAL AS NEEDED
Status: DISCONTINUED | OUTPATIENT
Start: 2021-03-19 | End: 2021-03-19 | Stop reason: SURG

## 2021-03-19 RX ORDER — OXYTOCIN-SODIUM CHLORIDE 0.9% IV SOLN 30 UNIT/500ML 30-0.9/5 UT/ML-%
999 SOLUTION INTRAVENOUS ONCE
Status: COMPLETED | OUTPATIENT
Start: 2021-03-19 | End: 2021-03-19

## 2021-03-19 RX ORDER — MISOPROSTOL 100 UG/1
600 TABLET ORAL AS NEEDED
Status: DISCONTINUED | OUTPATIENT
Start: 2021-03-19 | End: 2021-03-19 | Stop reason: HOSPADM

## 2021-03-19 RX ORDER — BISACODYL 10 MG
10 SUPPOSITORY, RECTAL RECTAL DAILY PRN
Status: DISCONTINUED | OUTPATIENT
Start: 2021-03-20 | End: 2021-03-21 | Stop reason: HOSPADM

## 2021-03-19 RX ORDER — ROPIVACAINE HYDROCHLORIDE 2 MG/ML
14 INJECTION, SOLUTION EPIDURAL; INFILTRATION; PERINEURAL CONTINUOUS
Status: DISCONTINUED | OUTPATIENT
Start: 2021-03-19 | End: 2021-03-19

## 2021-03-19 RX ORDER — METHYLERGONOVINE MALEATE 0.2 MG/ML
200 INJECTION INTRAVENOUS ONCE AS NEEDED
Status: DISCONTINUED | OUTPATIENT
Start: 2021-03-19 | End: 2021-03-19 | Stop reason: HOSPADM

## 2021-03-19 RX ORDER — OXYTOCIN-SODIUM CHLORIDE 0.9% IV SOLN 30 UNIT/500ML 30-0.9/5 UT/ML-%
125 SOLUTION INTRAVENOUS CONTINUOUS PRN
Status: DISCONTINUED | OUTPATIENT
Start: 2021-03-19 | End: 2021-03-19 | Stop reason: HOSPADM

## 2021-03-19 RX ORDER — IBUPROFEN 800 MG/1
800 TABLET ORAL EVERY 8 HOURS SCHEDULED
Status: DISCONTINUED | OUTPATIENT
Start: 2021-03-19 | End: 2021-03-19 | Stop reason: HOSPADM

## 2021-03-19 RX ORDER — EPHEDRINE SULFATE 50 MG/ML
10 INJECTION, SOLUTION INTRAVENOUS
Status: DISCONTINUED | OUTPATIENT
Start: 2021-03-19 | End: 2021-03-19 | Stop reason: HOSPADM

## 2021-03-19 RX ORDER — BUPIVACAINE HYDROCHLORIDE 2.5 MG/ML
INJECTION, SOLUTION EPIDURAL; INFILTRATION; INTRACAUDAL
Status: COMPLETED
Start: 2021-03-19 | End: 2021-03-19

## 2021-03-19 RX ORDER — SODIUM CHLORIDE 0.9 % (FLUSH) 0.9 %
1-10 SYRINGE (ML) INJECTION AS NEEDED
Status: DISCONTINUED | OUTPATIENT
Start: 2021-03-19 | End: 2021-03-21 | Stop reason: HOSPADM

## 2021-03-19 RX ORDER — DOCUSATE SODIUM 100 MG/1
100 CAPSULE, LIQUID FILLED ORAL 2 TIMES DAILY
Status: DISCONTINUED | OUTPATIENT
Start: 2021-03-19 | End: 2021-03-21 | Stop reason: HOSPADM

## 2021-03-19 RX ORDER — ONDANSETRON 2 MG/ML
4 INJECTION INTRAMUSCULAR; INTRAVENOUS ONCE AS NEEDED
Status: DISCONTINUED | OUTPATIENT
Start: 2021-03-19 | End: 2021-03-19 | Stop reason: HOSPADM

## 2021-03-19 RX ORDER — HYDROCORTISONE ACETATE PRAMOXINE HCL 1; 1 G/100G; G/100G
1 CREAM TOPICAL AS NEEDED
Status: DISCONTINUED | OUTPATIENT
Start: 2021-03-19 | End: 2021-03-21 | Stop reason: HOSPADM

## 2021-03-19 RX ORDER — ONDANSETRON 4 MG/1
4 TABLET, FILM COATED ORAL EVERY 6 HOURS PRN
Status: DISCONTINUED | OUTPATIENT
Start: 2021-03-19 | End: 2021-03-21 | Stop reason: HOSPADM

## 2021-03-19 RX ORDER — OXYTOCIN-SODIUM CHLORIDE 0.9% IV SOLN 30 UNIT/500ML 30-0.9/5 UT/ML-%
250 SOLUTION INTRAVENOUS CONTINUOUS
Status: DISCONTINUED | OUTPATIENT
Start: 2021-03-19 | End: 2021-03-19

## 2021-03-19 RX ORDER — CARBOPROST TROMETHAMINE 250 UG/ML
250 INJECTION, SOLUTION INTRAMUSCULAR AS NEEDED
Status: DISCONTINUED | OUTPATIENT
Start: 2021-03-19 | End: 2021-03-19 | Stop reason: HOSPADM

## 2021-03-19 RX ADMIN — OXYTOCIN 250 ML/HR: 10 INJECTION, SOLUTION INTRAMUSCULAR; INTRAVENOUS at 12:45

## 2021-03-19 RX ADMIN — MISOPROSTOL 600 MCG: 100 TABLET ORAL at 13:25

## 2021-03-19 RX ADMIN — OXYTOCIN 999 ML/HR: 10 INJECTION, SOLUTION INTRAMUSCULAR; INTRAVENOUS at 12:19

## 2021-03-19 RX ADMIN — IBUPROFEN 800 MG: 800 TABLET, FILM COATED ORAL at 21:19

## 2021-03-19 RX ADMIN — ONDANSETRON 4 MG: 2 INJECTION INTRAMUSCULAR; INTRAVENOUS at 13:09

## 2021-03-19 RX ADMIN — DOCUSATE SODIUM 100 MG: 100 CAPSULE, LIQUID FILLED ORAL at 21:19

## 2021-03-19 RX ADMIN — Medication: at 18:40

## 2021-03-19 RX ADMIN — ACETAMINOPHEN 650 MG: 325 TABLET ORAL at 09:08

## 2021-03-19 RX ADMIN — HYDROCODONE BITARTRATE AND ACETAMINOPHEN 1 TABLET: 5; 325 TABLET ORAL at 23:58

## 2021-03-19 RX ADMIN — BUPIVACAINE HYDROCHLORIDE 10 ML: 2.5 INJECTION, SOLUTION EPIDURAL; INFILTRATION; INTRACAUDAL; PERINEURAL at 10:27

## 2021-03-19 RX ADMIN — IBUPROFEN 800 MG: 800 TABLET, FILM COATED ORAL at 14:11

## 2021-03-19 NOTE — PROGRESS NOTES
"Discharge Planning Assessment  [facility]     Patient Name: Deanna Cuello  MRN: [id number]  Today's Date: 3/19/2021    Admit Date: 3/18/2021    SS received a consult for, \"History of THC use and Late/Limited Prenatal Care\". SS spoke with Christina in the lab at [facility]. She states that pt had positive UDS for THC on 1/19/21, 2/25/21, and 3/10/21. Pt is positive upon admission. Infant's UDS is negative.      SS spoke with pt today. She has delivered a viable baby girl, Marjan Cuello, on 3/19/21. She lives at home with FOB, Blair Wells, at address [address] [place] [address] ([facility] She has one other child, Get Cuello, who lives with his aunt. She does not deny THC use. She states that she did not receive prenatal care anywhere other than [facility], and states that she did not begin until January.      She states that she plans to sign up for [facility]. She has the car seat and all needed infant supplies.      SS made a report to central intake. Intake ID is [id number]. Report was not accepted for investigation. Infant to be discharged home with mother.      SS will follow with infant's meconium.     GABRIELA Garcia    "

## 2021-03-19 NOTE — L&D DELIVERY NOTE
Vaginal Delivery Procedure Note    Deanna Cuello  37w 1d  G 2,       OBGYN: Nav Wood MD      Pre-op Diagnosis: Complete Dilation      Anesthesia: Epidual        Detailed Description of Procedure     The patient was prepped and draped in normal sterile fashion. The head was delivered over a midline episiotomy. No extensions. The nares and mouth were bulb suctioned. There was no nuchal cord. Anterior and posterior shoulders delivered without any problems. The rest of the infant was delivered in controlled fashion. The placenta delivered intact. The patient tolerated the procedure well and went to the recovery room in stable condition.        Maternal Blood Type: O   Fetal Gender: F  Nuchal Cord: No  Tears: Midline Episiotomy No Extensions  Amniotic Fluid Clear  Blood Cord: Yes  Estimated Blood Loss: 300cc  Placenta: Spontaneous, Delivered Intact   Uterus Explored: Yes  Membranes: AROM  APGARS: 8 & 9    Disposition: Transfer to Women's Health Floor  Condition: Stable    Nav Wood M.D     Date: 3/19/2021  Time: 12:31 EDT

## 2021-03-19 NOTE — PLAN OF CARE
Goal Outcome Evaluation:   Patient states she is doing well at this time no questions or concerns voiced

## 2021-03-19 NOTE — H&P
Chief complaint   Chief Complaint   Patient presents with   • Laboring       History of Present Illness: Patient is a 20 y.o. female who presents with IUP at 37w1d weeks gestation. G2, P 1001. Active labor.       Past Medical History:   Diagnosis Date   • Anxiety    • Depression    • Development delay    • Urinary tract infection    • Urogenital trichomoniasis 2017   +UDS    Blood Type: O -  GBS: Negative    Past Surgical History:   Procedure Laterality Date   • EYE SURGERY Bilateral     GROWTH REMOVAL     Family History   Problem Relation Age of Onset   • Lung cancer Father    • Heart disease Mother    • No Known Problems Brother    • Seizures Sister    • Hydrocephalus Sister    • Asthma Sister    • No Known Problems Paternal Grandfather    • No Known Problems Paternal Grandmother    • Hypertension Maternal Grandmother    • Diabetes Maternal Grandmother    • Coronary artery disease Maternal Grandmother    • Alzheimer's disease Maternal Grandmother    • Lung cancer Maternal Grandfather    • Cancer Paternal Uncle    • Stomach cancer Paternal Uncle      Social History     Tobacco Use   • Smoking status: Current Every Day Smoker     Packs/day: 1.00     Years: 5.00     Pack years: 5.00   • Smokeless tobacco: Never Used   Substance Use Topics   • Alcohol use: No   • Drug use: Yes     Types: Marijuana     No medications prior to admission.     Allergies:  Patient has no known allergies.      Vital Signs  Temp:  [97.2 °F (36.2 °C)-98.7 °F (37.1 °C)] 97.2 °F (36.2 °C)  Heart Rate:  [75-79] 79  Resp:  [18-20] 20  BP: (111-124)/(60-65) 124/65    Radiology  Imaging Results (Last 24 Hours)     ** No results found for the last 24 hours. **          Labs  Lab Results (last 24 hours)     Procedure Component Value Units Date/Time    COVID PRE-OP / PRE-PROCEDURE SCREENING ORDER (NO ISOLATION) - Swab, Nasopharynx [[id number]]  (Normal) Collected: 03/18/21 1639    Specimen: Swab from Nasopharynx Updated: 03/18/21 8838     Narrative:      The following orders were created for panel order COVID PRE-OP / PRE-PROCEDURE SCREENING ORDER (NO ISOLATION) - Swab, Nasopharynx.  Procedure                               Abnormality         Status                     ---------                               -----------         ------                     COVID-19, ABBOTT IN-HOUS...[[id number]]  Normal              Final result                 Please view results for these tests on the individual orders.    COVID-19, ABBOTT IN-HOUSE,NASAL Swab (NO TRANSPORT MEDIA) 2 HR TAT - Swab, Nasopharynx [[id number]]  (Normal) Collected: 03/18/21 1639    Specimen: Swab from Nasopharynx Updated: 03/18/21 1740     COVID19 Presumptive Negative    Narrative:      Fact sheet for providers: https://www.fda.gov/media/378732/download     Fact sheet for patients: https://www.fda.gov/media/765265/download    Test performed by PCR.  If inconsistent with clinical signs and symptoms patient should be tested with different authorized molecular test.    Urine Drug Screen - [[id number]]  (Abnormal) Collected: 03/18/21 1638    Specimen: Urine Updated: 03/18/21 1722     Amphetamine Screen, Urine Negative     Barbiturates Screen, Urine Negative     Benzodiazepine Screen, Urine Negative     Cocaine Screen, Urine Negative     Methadone Screen, Urine Negative     Opiate Screen Negative     Phencyclidine (PCP), Urine Negative     THC, Screen, Urine Positive     6-ACETYL MORPHINE Negative     Buprenorphine, Screen, Urine Negative     Oxycodone Screen, Urine Negative    Narrative:      Negative Thresholds For Drugs Screened:                  Amphetamines              1000 ng/ml               Barbiturates               200 ng/ml               Benzodiazepines            200 ng/ml              Cocaine                    300 ng/ml              Methadone                  300 ng/ml              Opiates                    300 ng/ml               Phencyclidine               25 ng/ml                THC                         50 ng/ml              6-Acetyl Morphine           10 ng/ml              Buprenorphine                5 ng/ml              Oxycodone                  300 ng/ml    The reference range for all drugs tested is negative. This report includes final unconfirmed qualitative results to be used for medical treatment purposes only. Unconfirmed results must not be used for non-medical purposes such as employment or legal testing. Clinical consideration should be applied to any drug of abuse test, especially when unconfirmed quantitative results are used.        CBC & Differential [[id number]]  (Abnormal) Collected: 03/18/21 1642    Specimen: Blood Updated: 03/18/21 1655    Narrative:      The following orders were created for panel order CBC & Differential.  Procedure                               Abnormality         Status                     ---------                               -----------         ------                     CBC Auto Differential[[id number]]        Abnormal            Final result                 Please view results for these tests on the individual orders.    CBC Auto Differential [[id number]]  (Abnormal) Collected: 03/18/21 1642    Specimen: Blood Updated: 03/18/21 1655     WBC 9.45 10*3/mm3      RBC 3.89 10*6/mm3      Hemoglobin 12.6 g/dL      Hematocrit 36.8 %      MCV 94.6 fL      MCH 32.4 pg      MCHC 34.2 g/dL      RDW 12.5 %      RDW-SD 42.9 fl      MPV 10.5 fL      Platelets 212 10*3/mm3      Neutrophil % 75.7 %      Lymphocyte % 16.8 %      Monocyte % 5.6 %      Eosinophil % 0.7 %      Basophil % 0.2 %      Immature Grans % 1.0 %      Neutrophils, Absolute 7.15 10*3/mm3      Lymphocytes, Absolute 1.59 10*3/mm3      Monocytes, Absolute 0.53 10*3/mm3      Eosinophils, Absolute 0.07 10*3/mm3      Basophils, Absolute 0.02 10*3/mm3      Immature Grans, Absolute 0.09 10*3/mm3      nRBC 0.0 /100 WBC     Gonorrhea Screen - Swab, [[id number]] Resulted: 01/29/21    Specimen: Swab  Updated: 03/18/21 1653     External Gonorrhea Screen NEG    GTT 1 Hour [[id number]] Resulted: 01/19/21    Specimen: Blood Updated: 03/18/21 1653     External GTT 1 Hour 62    Group B Streptococcus Culture - Swab, Vaginal/Rectum [[id number]] Resulted: 03/10/21    Specimen: Swab from Vaginal/Rectum Updated: 03/18/21 1653     External Strep Group B Ag NEG    Chlamydia trachomatis, Neisseria gonorrhoeae, PCR w/ confirmation - Swab, Vagina [[id number]] Resulted: 01/29/21    Specimen: Swab from Vagina Updated: 03/18/21 1653     External Chlamydia Screen NEG            Review of Systems    The following systems were reviewed and negative;  ENT, respiratory, cardiovascular, gastrointestinal, genitourinary, breast, endocrine and allergies / immunologic.      Physical Exam:      General Appearance:    Alert, cooperative, in no acute distress   Head:    Normocephalic, without obvious abnormality, atraumatic   Eyes:            Lids and lashes normal, conjunctivae and sclerae normal, no   icterus, no pallor, corneas clear, PERRLA   Ears:    Ears appear intact with no abnormalities noted   Throat:   No oral lesions, no thrush, oral mucosa moist   Neck:   No adenopathy, supple, trachea midline, no thyromegaly, no     carotid bruit, no JVD   Back:     No kyphosis present, no scoliosis present, no skin lesions,       erythema or scars, no tenderness to percussion or                   palpation,   range of motion normal   Lungs:     Clear to auscultation,respirations regular, even and                   unlabored    Heart:    Regular rhythm and normal rate, normal S1 and S2, no            murmur, no gallop, no rub, no click   Breast Exam:    Deferred   Abdomen:     Normal bowel sounds, no masses, no organomegaly, soft        non-tender, non-distended, no guarding, no rebound                 tenderness   Genitalia:    Cervix: Dilated 6cm 70%   Extremities:   Moves all extremities well, no edema, no cyanosis, no              redness    Pulses:   Pulses palpable and equal bilaterally   Skin:   No bleeding, bruising or rash   Lymph nodes:   No palpable adenopathy   Neurologic:   Cranial nerves 2 - 12 grossly intact, sensation intact, DTR        present and equal bilaterally         Assessment: Patient is a 20 y.o. female who presents with IUP at 37w1d weeks gestation. G 2, P 1001.   Chief Complaint   Patient presents with   • Laboring       Plan of Care: Admit. Proceed with augmentation of labor.      Nav Wood III, MD  03/19/21  08:49 EDT

## 2021-03-19 NOTE — NON STRESS TEST
Deanna Cuello, a  at 37w0d with an PRESLEY of 2021, by Ultrasound, was seen at [facility] LABOR DELIVERY for a nonstress test.    Chief Complaint   Patient presents with   • Laboring       Patient Active Problem List   Diagnosis   • IUGR (intrauterine growth restriction) affecting care of mother, third trimester, fetus 1   • Pregnancy   • Pregnant       Start Time:   Stop Time:     Interpretation A  Nonstress Test Interpretation A: Reactive (21 : Mary Ann Ward, RN)  Comments A: Verified by Shelley Ch RN (21 : Mary Ann Ward, RN)

## 2021-03-19 NOTE — ANESTHESIA PREPROCEDURE EVALUATION
Anesthesia Evaluation     Patient summary reviewed and Nursing notes reviewed   no history of anesthetic complications:  NPO Solid Status: > 8 hours  NPO Liquid Status: > 8 hours           Airway   Mallampati: II  TM distance: >3 FB  Neck ROM: full  No difficulty expected  Dental - normal exam     Pulmonary - negative pulmonary ROS and normal exam   Cardiovascular - negative cardio ROS and normal exam        Neuro/Psych- negative ROS  GI/Hepatic/Renal/Endo - negative ROS     Musculoskeletal (-) negative ROS    Abdominal  - normal exam    Bowel sounds: normal.   Substance History - negative use     OB/GYN    (+) Pregnant,         Other                        Anesthesia Plan    ASA 2     epidural       Anesthetic plan, all risks, benefits, and alternatives have been provided, discussed and informed consent has been obtained with: patient.

## 2021-03-19 NOTE — PAYOR COMM NOTE
"CONTACT:  CRISTINA MACIEL MSN, APRN  UTILIZATION MANAGEMENT DEPT.  [facility]  [facility], [address]  PHONE:  [phone]  FAX: [phone]    ADMISSION NOTIFICATION    Ash Roach (20 y.o. Female)     Date of Birth Social Security Number Address Home Phone MRN    [id number]  [address] [place] [id number] [phone] [id number]    [employer] Marital Status          Jewish Single       Admission Date Admission Type Admitting Provider Attending Provider Department, Room/Bed    3/18/21 Elective Lloyd Kelly DO Ascani, Enrico III, MD [facility] LABOR DELIVERY, L2    Discharge Date Discharge Disposition Discharge Destination                       Attending Provider: Nav Wood III, MD    Allergies: No Known Allergies    Isolation: None   Infection: None   Code Status: CPR    Ht: 157.5 cm (62.01\")   Wt: 55.8 kg (123 lb)    Admission Cmt: None   Principal Problem: None                Active Insurance as of 3/18/2021     Primary Coverage     Payor Plan Insurance Group Employer/Plan Group    ANTHEM MEDICAID ANTHEM MEDICAID [id number]     Payor Plan Address Payor Plan Phone Number Payor Plan Fax Number Effective Dates    [address] [address] [phone]  2019 - None Entered    [facility] [id number]       Subscriber Name Subscriber Birth Date Member ID       ASH ROACH 2000 [id number]                 Emergency Contacts      (Rel.) Home Phone Work Phone Mobile Phone    Rosemarie Berkowitz (Sister) [phone] -- --        DELIVERY INFORMATION:    ADMIT DATE: 3/18/21    DELIVERY DATE AND TIME: 3/19/21 @ 1217    DELIVERY TYPE: VAGINAL    GENDER OF BABY: FEMALE    APGARS:  8/9    NURSERY TYPE: WELL BABY    GESTATIONAL AGE: 37/1    EDC: 20    /PARA: 2/2       History & Physical      Nav Wood III, MD at 21 [phone]                Chief complaint   Chief Complaint   Patient presents with   • Laboring       History of Present " Illness: Patient is a 20 y.o. female who presents with IUP at 37w1d weeks gestation. G2, P 1001. Active labor.       Past Medical History:   Diagnosis Date   • Anxiety    • Depression    • Development delay    • Urinary tract infection    • Urogenital trichomoniasis 2017   +UDS    Blood Type: O -  GBS: Negative    Past Surgical History:   Procedure Laterality Date   • EYE SURGERY Bilateral     GROWTH REMOVAL     Family History   Problem Relation Age of Onset   • Lung cancer Father    • Heart disease Mother    • No Known Problems Brother    • Seizures Sister    • Hydrocephalus Sister    • Asthma Sister    • No Known Problems Paternal Grandfather    • No Known Problems Paternal Grandmother    • Hypertension Maternal Grandmother    • Diabetes Maternal Grandmother    • Coronary artery disease Maternal Grandmother    • Alzheimer's disease Maternal Grandmother    • Lung cancer Maternal Grandfather    • Cancer Paternal Uncle    • Stomach cancer Paternal Uncle      Social History     Tobacco Use   • Smoking status: Current Every Day Smoker     Packs/day: 1.00     Years: 5.00     Pack years: 5.00   • Smokeless tobacco: Never Used   Substance Use Topics   • Alcohol use: No   • Drug use: Yes     Types: Marijuana     No medications prior to admission.     Allergies:  Patient has no known allergies.      Vital Signs  Temp:  [97.2 °F (36.2 °C)-98.7 °F (37.1 °C)] 97.2 °F (36.2 °C)  Heart Rate:  [75-79] 79  Resp:  [18-20] 20  BP: (111-124)/(60-65) 124/65    Radiology  Imaging Results (Last 24 Hours)     ** No results found for the last 24 hours. **          Labs  Lab Results (last 24 hours)     Procedure Component Value Units Date/Time    COVID PRE-OP / PRE-PROCEDURE SCREENING ORDER (NO ISOLATION) - Swab, Nasopharynx [[id number]]  (Normal) Collected: 03/18/21 1639    Specimen: Swab from Nasopharynx Updated: 03/18/21 1740    Narrative:      The following orders were created for panel order COVID PRE-OP / PRE-PROCEDURE SCREENING ORDER  (NO ISOLATION) - Swab, Nasopharynx.  Procedure                               Abnormality         Status                     ---------                               -----------         ------                     COVID-19, ABBOTT IN-HOUS...[[id number]]  Normal              Final result                 Please view results for these tests on the individual orders.    COVID-19, ABBOTT IN-HOUSE,NASAL Swab (NO TRANSPORT MEDIA) 2 HR TAT - Swab, Nasopharynx [[id number]]  (Normal) Collected: 03/18/21 1639    Specimen: Swab from Nasopharynx Updated: 03/18/21 1740     COVID19 Presumptive Negative    Narrative:      Fact sheet for providers: https://www.fda.gov/media/155908/download     Fact sheet for patients: https://www.fda.gov/media/229350/download    Test performed by PCR.  If inconsistent with clinical signs and symptoms patient should be tested with different authorized molecular test.    Urine Drug Screen - [[id number]]  (Abnormal) Collected: 03/18/21 1638    Specimen: Urine Updated: 03/18/21 1722     Amphetamine Screen, Urine Negative     Barbiturates Screen, Urine Negative     Benzodiazepine Screen, Urine Negative     Cocaine Screen, Urine Negative     Methadone Screen, Urine Negative     Opiate Screen Negative     Phencyclidine (PCP), Urine Negative     THC, Screen, Urine Positive     6-ACETYL MORPHINE Negative     Buprenorphine, Screen, Urine Negative     Oxycodone Screen, Urine Negative    Narrative:      Negative Thresholds For Drugs Screened:                  Amphetamines              1000 ng/ml               Barbiturates               200 ng/ml               Benzodiazepines            200 ng/ml              Cocaine                    300 ng/ml              Methadone                  300 ng/ml              Opiates                    300 ng/ml               Phencyclidine               25 ng/ml               THC                         50 ng/ml              6-Acetyl Morphine           10 ng/ml               Buprenorphine                5 ng/ml              Oxycodone                  300 ng/ml    The reference range for all drugs tested is negative. This report includes final unconfirmed qualitative results to be used for medical treatment purposes only. Unconfirmed results must not be used for non-medical purposes such as employment or legal testing. Clinical consideration should be applied to any drug of abuse test, especially when unconfirmed quantitative results are used.        CBC & Differential [[id number]]  (Abnormal) Collected: 03/18/21 1642    Specimen: Blood Updated: 03/18/21 1655    Narrative:      The following orders were created for panel order CBC & Differential.  Procedure                               Abnormality         Status                     ---------                               -----------         ------                     CBC Auto Differential[[id number]]        Abnormal            Final result                 Please view results for these tests on the individual orders.    CBC Auto Differential [[id number]]  (Abnormal) Collected: 03/18/21 1642    Specimen: Blood Updated: 03/18/21 1655     WBC 9.45 10*3/mm3      RBC 3.89 10*6/mm3      Hemoglobin 12.6 g/dL      Hematocrit 36.8 %      MCV 94.6 fL      MCH 32.4 pg      MCHC 34.2 g/dL      RDW 12.5 %      RDW-SD 42.9 fl      MPV 10.5 fL      Platelets 212 10*3/mm3      Neutrophil % 75.7 %      Lymphocyte % 16.8 %      Monocyte % 5.6 %      Eosinophil % 0.7 %      Basophil % 0.2 %      Immature Grans % 1.0 %      Neutrophils, Absolute 7.15 10*3/mm3      Lymphocytes, Absolute 1.59 10*3/mm3      Monocytes, Absolute 0.53 10*3/mm3      Eosinophils, Absolute 0.07 10*3/mm3      Basophils, Absolute 0.02 10*3/mm3      Immature Grans, Absolute 0.09 10*3/mm3      nRBC 0.0 /100 WBC     Gonorrhea Screen - Swab, [[id number]] Resulted: 01/29/21    Specimen: Swab Updated: 03/18/21 1653     External Gonorrhea Screen NEG    GTT 1 Hour [[id number]] Resulted: 01/19/21     Specimen: Blood Updated: 03/18/21 1653     External GTT 1 Hour 62    Group B Streptococcus Culture - Swab, Vaginal/Rectum [[id number]] Resulted: 03/10/21    Specimen: Swab from Vaginal/Rectum Updated: 03/18/21 1653     External Strep Group B Ag NEG    Chlamydia trachomatis, Neisseria gonorrhoeae, PCR w/ confirmation - Swab, Vagina [[id number]] Resulted: 01/29/21    Specimen: Swab from Vagina Updated: 03/18/21 1653     External Chlamydia Screen NEG            Review of Systems    The following systems were reviewed and negative;  ENT, respiratory, cardiovascular, gastrointestinal, genitourinary, breast, endocrine and allergies / immunologic.      Physical Exam:      General Appearance:    Alert, cooperative, in no acute distress   Head:    Normocephalic, without obvious abnormality, atraumatic   Eyes:            Lids and lashes normal, conjunctivae and sclerae normal, no   icterus, no pallor, corneas clear, PERRLA   Ears:    Ears appear intact with no abnormalities noted   Throat:   No oral lesions, no thrush, oral mucosa moist   Neck:   No adenopathy, supple, trachea midline, no thyromegaly, no     carotid bruit, no JVD   Back:     No kyphosis present, no scoliosis present, no skin lesions,       erythema or scars, no tenderness to percussion or                   palpation,   range of motion normal   Lungs:     Clear to auscultation,respirations regular, even and                   unlabored    Heart:    Regular rhythm and normal rate, normal S1 and S2, no            murmur, no gallop, no rub, no click   Breast Exam:    Deferred   Abdomen:     Normal bowel sounds, no masses, no organomegaly, soft        non-tender, non-distended, no guarding, no rebound                 tenderness   Genitalia:    Cervix: Dilated 6cm 70%   Extremities:   Moves all extremities well, no edema, no cyanosis, no              redness   Pulses:   Pulses palpable and equal bilaterally   Skin:   No bleeding, bruising or rash   Lymph nodes:    No palpable adenopathy   Neurologic:   Cranial nerves 2 - 12 grossly intact, sensation intact, DTR        present and equal bilaterally         Assessment: Patient is a 20 y.o. female who presents with IUP at 37w1d weeks gestation. G 2, P 1001.   Chief Complaint   Patient presents with   • Laboring       Plan of Care: Admit. Proceed with augmentation of labor.      Nav Wood III, MD  03/19/21  08:49 EDT      Electronically signed by Nav Wood III, MD at 03/19/21 0851     H&P signed by [employer], [employer] at 03/19/21 0612      Scan on 3/18/2021 by [employer], [employer]: [facility], [facility], 03/18/2021          Electronically signed by [employer], [employer] at 03/19/21 0612          Operative/Procedure Notes (all)      Nav Wood III, MD at 03/19/21 1230  Version 2 of 2       Vaginal Delivery Procedure Note    Deanna Cuello  37w 1d  G 2,       OBGYN: Nav Wood MD      Pre-op Diagnosis: Complete Dilation      Anesthesia: Epidual        Detailed Description of Procedure     The patient was prepped and draped in normal sterile fashion. The head was delivered over a midline episiotomy. No extensions. The nares and mouth were bulb suctioned. There was no nuchal cord. Anterior and posterior shoulders delivered without any problems. The rest of the infant was delivered in controlled fashion. The placenta delivered intact. The patient tolerated the procedure well and went to the recovery room in stable condition.        Maternal Blood Type: O   Fetal Gender: F  Nuchal Cord: No  Tears: Midline Episiotomy No Extensions  Amniotic Fluid Clear  Blood Cord: Yes  Estimated Blood Loss: 300cc  Placenta: Spontaneous, Delivered Intact   Uterus Explored: Yes  Membranes: AROM  APGARS: 8 & 9    Disposition: Transfer to Women's Health Floor  Condition: Stable    Nav Wood M.D     Date: 3/19/2021  Time: 12:31 EDT      Electronically signed by Nav Wood III, MD at 03/19/21 1238     aNv Wood III,  MD at 03/19/21 1230  Version 1 of 2       Vaginal Delivery Procedure Note    Deanna Cuello  37w 1d  G 2,       OBGYN: Nav Wood MD      Pre-op Diagnosis: Complete Dilation      Anesthesia: Epidual        Detailed Description of Procedure     The patient was prepped and draped in normal sterile fashion. The head was delivered over an intact perineum. The nares and mouth were bulb suctioned. There was no nuchal cord. Anterior and posterior shoulders delivered without any problems. The rest of the infant was delivered in controlled fashion. The placenta delivered intact. The patient tolerated the procedure well and went to the recovery room in stable condition.        Maternal Blood Type: O   Fetal Gender: F  Nuchal Cord: No  Tears: No  Amniotic Fluid Clear  Blood Cord: Yes  Estimated Blood Loss: 300cc  Placenta: Spontaneous, Delivered Intact   Uterus Explored: Yes  Membranes: AROM  APGARS: 8 & 9    Disposition: Transfer to Women's Health Floor  Condition: Stable    Nav Wood M.D     Date: 3/19/2021  Time: 12:31 EDT      Electronically signed by Nav Wood III, MD at 03/19/21 3513

## 2021-03-19 NOTE — ANESTHESIA PROCEDURE NOTES
Labor Epidural    Pre-sedation assessment completed: 3/19/2021 10:19 AM    Patient location during procedure: OB  Start Time: 3/19/2021 10:19 AM  Stop Time: 3/19/2021 10:26 AM  Indication:at surgeon's request  Performed By  Anesthesiologist: Lucho Bloom MD  Preanesthetic Checklist  Completed: patient identified, IV checked, site marked, risks and benefits discussed, surgical consent, monitors and equipment checked, pre-op evaluation and timeout performed  Prep:  Pt Position:sitting  Sterile Tech:gloves, mask, sterile barrier and cap  Prep:povidone-iodine 7.5% surgical scrub  Monitoring:blood pressure monitoring  Epidural Block Procedure:  Approach:midline  Guidance:landmark technique and palpation technique  Location:L3-L4  Needle Type:Tuohy  Needle Gauge:17 G  Loss of Resistance: 5cm  Cath Depth at skin:7 cm  Paresthesia: none  Aspiration:negative  Test Dose:negative  Number of Attempts: 1  Post Assessment:  Dressing:occlusive dressing applied and secured with tape  Pt Tolerance:patient tolerated the procedure well with no apparent complications  Complications:no

## 2021-03-19 NOTE — PLAN OF CARE
Problem: Adult Inpatient Plan of Care  Goal: Plan of Care Review  Outcome: Ongoing, Progressing  Flowsheets (Taken 3/18/2021 1834 by Meghann Lazcano RN)  Plan of Care Reviewed With:   patient   significant other  Goal: Patient-Specific Goal (Individualized)  Outcome: Ongoing, Progressing  Goal: Absence of Hospital-Acquired Illness or Injury  Outcome: Ongoing, Progressing  Intervention: Identify and Manage Fall Risk  Recent Flowsheet Documentation  Taken 3/19/2021 1435 by Hilda Serra, RN  Safety Promotion/Fall Prevention:   activity supervised   safety round/check completed   nonskid shoes/slippers when out of bed  Goal: Optimal Comfort and Wellbeing  Outcome: Ongoing, Progressing  Intervention: Provide Person-Centered Care  Recent Flowsheet Documentation  Taken 3/19/2021 1435 by Hilda Serra, RN  Trust Relationship/Rapport:   care explained   questions answered   questions encouraged   reassurance provided   thoughts/feelings acknowledged   emotional support provided   choices provided  Goal: Readiness for Transition of Care  Outcome: Ongoing, Progressing     Problem: Adjustment to Role Transition (Postpartum Vaginal Delivery)  Goal: Successful Maternal Role Transition  Outcome: Ongoing, Progressing     Problem: Bleeding (Postpartum Vaginal Delivery)  Goal: Hemostasis  Outcome: Ongoing, Progressing     Problem: Infection (Postpartum Vaginal Delivery)  Goal: Absence of Infection Signs and Symptoms  Outcome: Ongoing, Progressing     Problem: Pain (Postpartum Vaginal Delivery)  Goal: Acceptable Pain Control  Outcome: Ongoing, Progressing  Intervention: Prevent or Manage Pain  Recent Flowsheet Documentation  Taken 3/19/2021 1435 by Hilda Serra, RN  Pain Management Interventions: pain management plan reviewed with patient/caregiver     Problem: Urinary Retention (Postpartum Vaginal Delivery)  Goal: Effective Urinary Elimination  Outcome: Ongoing, Progressing  Intervention: Promote Effective  Urinary Elimination  Recent Flowsheet Documentation  Taken 3/19/2021 1435 by Hilda Serra, RN  Urinary Elimination Promotion: frequent voiding encouraged   Goal Outcome Evaluation:

## 2021-03-20 LAB
ABO GROUP BLD: NORMAL
BASOPHILS # BLD AUTO: 0.02 10*3/MM3 (ref 0–0.2)
BASOPHILS NFR BLD AUTO: 0.2 % (ref 0–1.5)
DEPRECATED RDW RBC AUTO: 44.3 FL (ref 37–54)
EOSINOPHIL # BLD AUTO: 0.11 10*3/MM3 (ref 0–0.4)
EOSINOPHIL NFR BLD AUTO: 1.2 % (ref 0.3–6.2)
ERYTHROCYTE [DISTWIDTH] IN BLOOD BY AUTOMATED COUNT: 12.9 % (ref 12.3–15.4)
FETAL BLEED: NEGATIVE
HCT VFR BLD AUTO: 31.4 % (ref 34–46.6)
HGB BLD-MCNC: 10.6 G/DL (ref 12–15.9)
IMM GRANULOCYTES # BLD AUTO: 0.06 10*3/MM3 (ref 0–0.05)
IMM GRANULOCYTES NFR BLD AUTO: 0.7 % (ref 0–0.5)
LYMPHOCYTES # BLD AUTO: 2.19 10*3/MM3 (ref 0.7–3.1)
LYMPHOCYTES NFR BLD AUTO: 24.8 % (ref 19.6–45.3)
MCH RBC QN AUTO: 32.4 PG (ref 26.6–33)
MCHC RBC AUTO-ENTMCNC: 33.8 G/DL (ref 31.5–35.7)
MCV RBC AUTO: 96 FL (ref 79–97)
MONOCYTES # BLD AUTO: 0.84 10*3/MM3 (ref 0.1–0.9)
MONOCYTES NFR BLD AUTO: 9.5 % (ref 5–12)
NEUTROPHILS NFR BLD AUTO: 5.6 10*3/MM3 (ref 1.7–7)
NEUTROPHILS NFR BLD AUTO: 63.6 % (ref 42.7–76)
NRBC BLD AUTO-RTO: 0 /100 WBC (ref 0–0.2)
NUMBER OF DOSES: NORMAL
PLATELET # BLD AUTO: 164 10*3/MM3 (ref 140–450)
PMV BLD AUTO: 10.6 FL (ref 6–12)
RBC # BLD AUTO: 3.27 10*6/MM3 (ref 3.77–5.28)
RH BLD: NEGATIVE
WBC # BLD AUTO: 8.82 10*3/MM3 (ref 3.4–10.8)

## 2021-03-20 PROCEDURE — 85461 HEMOGLOBIN FETAL: CPT | Performed by: OBSTETRICS & GYNECOLOGY

## 2021-03-20 PROCEDURE — 86900 BLOOD TYPING SEROLOGIC ABO: CPT | Performed by: OBSTETRICS & GYNECOLOGY

## 2021-03-20 PROCEDURE — 85025 COMPLETE CBC W/AUTO DIFF WBC: CPT | Performed by: OBSTETRICS & GYNECOLOGY

## 2021-03-20 PROCEDURE — 86901 BLOOD TYPING SEROLOGIC RH(D): CPT | Performed by: OBSTETRICS & GYNECOLOGY

## 2021-03-20 PROCEDURE — 25010000002 RHO D IMMUNE GLOBULIN 1500 UNITS SOLUTION PREFILLED SYRINGE: Performed by: OBSTETRICS & GYNECOLOGY

## 2021-03-20 RX ADMIN — HYDROCODONE BITARTRATE AND ACETAMINOPHEN 1 TABLET: 5; 325 TABLET ORAL at 09:30

## 2021-03-20 RX ADMIN — HUMAN RHO(D) IMMUNE GLOBULIN 1500 UNITS: 300 INJECTION, SOLUTION INTRAMUSCULAR at 14:44

## 2021-03-20 RX ADMIN — HYDROCODONE BITARTRATE AND ACETAMINOPHEN 1 TABLET: 5; 325 TABLET ORAL at 19:34

## 2021-03-20 RX ADMIN — DOCUSATE SODIUM 100 MG: 100 CAPSULE, LIQUID FILLED ORAL at 21:49

## 2021-03-20 RX ADMIN — IBUPROFEN 800 MG: 800 TABLET, FILM COATED ORAL at 14:44

## 2021-03-20 RX ADMIN — HYDROCODONE BITARTRATE AND ACETAMINOPHEN 1 TABLET: 5; 325 TABLET ORAL at 04:09

## 2021-03-20 RX ADMIN — DOCUSATE SODIUM 100 MG: 100 CAPSULE, LIQUID FILLED ORAL at 08:14

## 2021-03-20 RX ADMIN — IBUPROFEN 800 MG: 800 TABLET, FILM COATED ORAL at 05:27

## 2021-03-20 RX ADMIN — IBUPROFEN 800 MG: 800 TABLET, FILM COATED ORAL at 21:49

## 2021-03-20 NOTE — PLAN OF CARE
Problem: Adult Inpatient Plan of Care  Goal: Plan of Care Review  Outcome: Ongoing, Progressing  Flowsheets  Taken 3/20/2021 0326 by Lucrecia Mcdowell RN  Progress: improving  Outcome Summary: Patient's bleeding and vitals wnl, pain controlled with prn meds, no complaints at this time  Taken 3/18/2021 1834 by Meghann Lazcano RN  Plan of Care Reviewed With:   patient   significant other  Goal: Patient-Specific Goal (Individualized)  Outcome: Ongoing, Progressing  Goal: Absence of Hospital-Acquired Illness or Injury  Outcome: Ongoing, Progressing  Intervention: Identify and Manage Fall Risk  Recent Flowsheet Documentation  Taken 3/20/2021 0810 by Hilda Serra, RN  Safety Promotion/Fall Prevention: safety round/check completed  Goal: Optimal Comfort and Wellbeing  Outcome: Ongoing, Progressing  Intervention: Provide Person-Centered Care  Recent Flowsheet Documentation  Taken 3/20/2021 0810 by Hilda Serra, RN  Trust Relationship/Rapport: care explained  Goal: Readiness for Transition of Care  Outcome: Ongoing, Progressing     Problem: Adjustment to Role Transition (Postpartum Vaginal Delivery)  Goal: Successful Maternal Role Transition  Outcome: Ongoing, Progressing     Problem: Bleeding (Postpartum Vaginal Delivery)  Goal: Hemostasis  Outcome: Ongoing, Progressing     Problem: Infection (Postpartum Vaginal Delivery)  Goal: Absence of Infection Signs and Symptoms  Outcome: Ongoing, Progressing     Problem: Pain (Postpartum Vaginal Delivery)  Goal: Acceptable Pain Control  Outcome: Ongoing, Progressing  Intervention: Prevent or Manage Pain  Recent Flowsheet Documentation  Taken 3/20/2021 0810 by Hilda Serra, RN  Pain Management Interventions: pain management plan reviewed with patient/caregiver     Problem: Urinary Retention (Postpartum Vaginal Delivery)  Goal: Effective Urinary Elimination  Outcome: Ongoing, Progressing  Intervention: Promote Effective Urinary Elimination  Recent Flowsheet  Documentation  Taken 3/20/2021 0810 by Hilda Serra, RN  Urinary Elimination Promotion: frequent voiding encouraged   Goal Outcome Evaluation:

## 2021-03-20 NOTE — PROGRESS NOTES
Spontaneous Vaginal Delivery Progress Note      Hospital Course: G 2, now P2. Patient was admitted on 3/18/2021  4:21 PM with IUP at 37w1d weeks gestation secondary to Pregnancy [Z34.90]. Patient underwent a normal spontaneous vaginal delivery.       Patient stable. No complaints.     signs in last 24 hours:    Vital Signs Range for the last 24 hours              Temp:  [96.4 °F (35.8 °C)-98.3 °F (36.8 °C)] 97.7 °F (36.5 °C)  Heart Rate:  [60-97] 60  Resp:  [18-20] 18  BP: ()/(47-92) 98/53     Radiology     Imaging Results (Last 24 Hours)     ** No results found for the last 24 hours. **           Labs     Lab Results (last 24 hours)     Procedure Component Value Units Date/Time    CBC & Differential [[id number]]  (Abnormal) Collected: 03/20/21 0643    Specimen: Blood Updated: 03/20/21 0730    Narrative:      The following orders were created for panel order CBC & Differential.  Procedure                               Abnormality         Status                     ---------                               -----------         ------                     CBC Auto Differential[[id number]]        Abnormal            Final result                 Please view results for these tests on the individual orders.    CBC Auto Differential [[id number]]  (Abnormal) Collected: 03/20/21 0643    Specimen: Blood Updated: 03/20/21 0730     WBC 8.82 10*3/mm3      RBC 3.27 10*6/mm3      Hemoglobin 10.6 g/dL      Hematocrit 31.4 %      MCV 96.0 fL      MCH 32.4 pg      MCHC 33.8 g/dL      RDW 12.9 %      RDW-SD 44.3 fl      MPV 10.6 fL      Platelets 164 10*3/mm3      Neutrophil % 63.6 %      Lymphocyte % 24.8 %      Monocyte % 9.5 %      Eosinophil % 1.2 %      Basophil % 0.2 %      Immature Grans % 0.7 %      Neutrophils, Absolute 5.60 10*3/mm3      Lymphocytes, Absolute 2.19 10*3/mm3      Monocytes, Absolute 0.84 10*3/mm3      Eosinophils, Absolute 0.11 10*3/mm3      Basophils, Absolute 0.02 10*3/mm3      Immature Grans, Absolute  0.06 10*3/mm3      nRBC 0.0 /100 WBC             Review of Systems    The following systems were reviewed and negative;  ENT, respiratory, cardiovascular, gastrointestinal, genitourinary, breast, endocrine and allergies / immunologic.      Physical Exam:      General Appearance:    Alert, cooperative, in no acute distress   Head:    Normocephalic, without obvious abnormality, atraumatic   Eyes:            Lids and lashes normal, conjunctivae and sclerae normal, no   icterus, no pallor, corneas clear, PERRLA   Ears:    Ears appear intact with no abnormalities noted   Throat:   No oral lesions, no thrush, oral mucosa moist   Neck:   No adenopathy, supple, trachea midline, no thyromegaly, no     carotid bruit, no JVD   Back:     No kyphosis present, no scoliosis present, no skin lesions,       erythema or scars, no tenderness to percussion or                   palpation,   range of motion normal   Lungs:     Clear to auscultation,respirations regular, even and                   unlabored    Heart:    Regular rhythm and normal rate, normal S1 and S2, no            murmur, no gallop, no rub, no click   Breast Exam:    Deferred   Abdomen:     Normal bowel sounds, no masses, no organomegaly, soft        non-tender, non-distended, no guarding, no rebound                 tenderness   Genitalia:    Deferred   Extremities:   Moves all extremities well, no edema, no cyanosis, no              redness   Pulses:   Pulses palpable and equal bilaterally   Skin:   No bleeding, bruising or rash   Lymph nodes:   No palpable adenopathy   Neurologic:   Cranial nerves 2 - 12 grossly intact, sensation intact, DTR        present and equal bilaterally                 Assessment:  1.  . PPD#1. Patient doing well. No complaints.     Plan:  1. Will continue current plan of care and anticipate discharge to home in the AM.      Nav Wood III, MD  21  11:34 EDT

## 2021-03-20 NOTE — PLAN OF CARE
Goal Outcome Evaluation:     Progress: improving  Outcome Summary: Patient's bleeding and vitals wnl, pain controlled with prn meds, no complaints at this time

## 2021-03-21 VITALS
HEART RATE: 71 BPM | SYSTOLIC BLOOD PRESSURE: 113 MMHG | OXYGEN SATURATION: 98 % | HEIGHT: 62 IN | WEIGHT: 123 LBS | RESPIRATION RATE: 16 BRPM | DIASTOLIC BLOOD PRESSURE: 75 MMHG | BODY MASS INDEX: 22.63 KG/M2 | TEMPERATURE: 98.4 F

## 2021-03-21 PROBLEM — O36.5931 IUGR (INTRAUTERINE GROWTH RESTRICTION) AFFECTING CARE OF MOTHER, THIRD TRIMESTER, FETUS 1: Status: RESOLVED | Noted: 2019-08-16 | Resolved: 2021-03-21

## 2021-03-21 PROBLEM — Z34.90 PREGNANT: Status: RESOLVED | Noted: 2021-02-26 | Resolved: 2021-03-21

## 2021-03-21 PROBLEM — Z34.90 PREGNANCY: Status: RESOLVED | Noted: 2021-02-25 | Resolved: 2021-03-21

## 2021-03-21 RX ORDER — IBUPROFEN 800 MG/1
800 TABLET ORAL EVERY 6 HOURS PRN
Qty: 30 TABLET | Refills: 0 | Status: ON HOLD | OUTPATIENT
Start: 2021-03-21 | End: 2021-12-08

## 2021-03-21 RX ADMIN — IBUPROFEN 800 MG: 800 TABLET, FILM COATED ORAL at 13:35

## 2021-03-21 RX ADMIN — DOCUSATE SODIUM 100 MG: 100 CAPSULE, LIQUID FILLED ORAL at 08:40

## 2021-03-21 RX ADMIN — IBUPROFEN 800 MG: 800 TABLET, FILM COATED ORAL at 05:50

## 2021-03-21 RX ADMIN — HYDROCODONE BITARTRATE AND ACETAMINOPHEN 1 TABLET: 5; 325 TABLET ORAL at 09:56

## 2021-03-21 NOTE — PLAN OF CARE
Problem: Adult Inpatient Plan of Care  Goal: Plan of Care Review  Outcome: Ongoing, Progressing  Flowsheets  Taken 3/21/2021 0437 by Millicent León, RN  Progress: improving  Outcome Summary: Pt doing well postpartum, bleeding is light, pain controlled with prn meds.  Taken 3/18/2021 1834 by Meghann Lazcano RN  Plan of Care Reviewed With:   patient   significant other  Goal: Patient-Specific Goal (Individualized)  Outcome: Ongoing, Progressing  Flowsheets (Taken 3/21/2021 0437)  Patient-Specific Goals (Include Timeframe): Pt will be able to care for self and infant upon discharge.  Goal: Absence of Hospital-Acquired Illness or Injury  Outcome: Ongoing, Progressing  Intervention: Identify and Manage Fall Risk  Flowsheets  Taken 3/21/2021 0437  Safety Promotion/Fall Prevention:   fall prevention program maintained   clutter free environment maintained   assistive device/personal items within reach   nonskid shoes/slippers when out of bed  Taken 3/21/2021 0300  Safety Promotion/Fall Prevention: safety round/check completed  Taken 3/21/2021 0100  Safety Promotion/Fall Prevention: safety round/check completed  Taken 3/20/2021 2300  Safety Promotion/Fall Prevention: safety round/check completed  Taken 3/20/2021 2200  Safety Promotion/Fall Prevention: safety round/check completed  Taken 3/20/2021 2100  Safety Promotion/Fall Prevention: safety round/check completed  Taken 3/20/2021 1934  Safety Promotion/Fall Prevention:   safety round/check completed   nonskid shoes/slippers when out of bed   fall prevention program maintained   clutter free environment maintained   assistive device/personal items within reach  Intervention: Prevent Skin Injury  Flowsheets (Taken 3/21/2021 0437)  Body Position: position changed independently  Intervention: Prevent Infection  Flowsheets (Taken 3/20/2021 1934)  Infection Prevention:   rest/sleep promoted   personal protective equipment utilized   hand hygiene promoted  Goal:  Optimal Comfort and Wellbeing  Outcome: Ongoing, Progressing  Intervention: Provide Person-Centered Care  Flowsheets (Taken 3/20/2021 1934)  Trust Relationship/Rapport:   care explained   questions answered   questions encouraged  Goal: Readiness for Transition of Care  Outcome: Ongoing, Progressing     Problem: Adjustment to Role Transition (Postpartum Vaginal Delivery)  Goal: Successful Maternal Role Transition  Outcome: Ongoing, Progressing  Intervention: Support Maternal Role Transition  Flowsheets (Taken 3/21/2021 0437)  Supportive Measures: self-care encouraged  Parent/Child Attachment Promotion:   caring behavior modeled   positive reinforcement provided     Problem: Bleeding (Postpartum Vaginal Delivery)  Goal: Hemostasis  Outcome: Ongoing, Progressing     Problem: Infection (Postpartum Vaginal Delivery)  Goal: Absence of Infection Signs and Symptoms  Outcome: Ongoing, Progressing     Problem: Pain (Postpartum Vaginal Delivery)  Goal: Acceptable Pain Control  Outcome: Ongoing, Progressing  Intervention: Prevent or Manage Pain  Flowsheets  Taken 3/21/2021 0437  Pain Management Interventions:   pain management plan reviewed with patient/caregiver   quiet environment facilitated   see MAR  Taken 3/20/2021 1934  Pain Management Interventions: see MAR     Problem: Urinary Retention (Postpartum Vaginal Delivery)  Goal: Effective Urinary Elimination  Outcome: Ongoing, Progressing  Intervention: Promote Effective Urinary Elimination  Flowsheets (Taken 3/20/2021 0810 by Hilda Serra, RN)  Urinary Elimination Promotion: frequent voiding encouraged   Goal Outcome Evaluation:     Progress: improving  Outcome Summary: Pt doing well postpartum, bleeding is light, pain controlled with prn meds.

## 2021-03-21 NOTE — DISCHARGE SUMMARY
Discharge Summary: Vaginal Delivery     Admit Date: 3/18/2021  4:21 PM    Admit Diagnosis: Pregnancy [Z34.90]    Date of Discharge:  3/21/2021    Discharge Diagnosis: Same        Hospital Course  Patient is a 20 y.o. female, G 2, now P 2. S/P . PPD#2. Patient doing well. No complaints. Patient tolerating a regular diet and ambulating without difficulty. Will discharge to home today.     Procedures Performed  Normal Spontaneous Vaginal Delivery         Vital Signs  Temp:  [97.4 °F (36.3 °C)-98.4 °F (36.9 °C)] 98.4 °F (36.9 °C)  Heart Rate:  [70-71] 71  Resp:  [16] 16  BP: (113-134)/(75-77) 113/75    Review of Systems    The following systems were reviewed and negative;  ENT, respiratory, cardiovascular, gastrointestinal, genitourinary, breast, endocrine and allergies / immunologic.      Physical Exam:      General Appearance:    Alert, cooperative, in no acute distress   Head:    Normocephalic, without obvious abnormality, atraumatic   Eyes:            Lids and lashes normal, conjunctivae and sclerae normal, no   icterus, no pallor, corneas clear, PERRLA   Ears:    Ears appear intact with no abnormalities noted   Throat:   No oral lesions, no thrush, oral mucosa moist   Neck:   No adenopathy, supple, trachea midline, no thyromegaly, no     carotid bruit, no JVD   Back:     No kyphosis present, no scoliosis present, no skin lesions,       erythema or scars, no tenderness to percussion or                   palpation,   range of motion normal   Lungs:     Clear to auscultation,respirations regular, even and                   unlabored    Heart:    Regular rhythm and normal rate, normal S1 and S2, no            murmur, no gallop, no rub, no click   Breast Exam:    Deferred   Abdomen:     Normal bowel sounds, no masses, no organomegaly, soft        non-tender, non-distended, no guarding, no rebound                 tenderness   Genitalia:    Deferred   Extremities:   Moves all extremities well, no edema, no cyanosis, no               redness   Pulses:   Pulses palpable and equal bilaterally   Skin:   No bleeding, bruising or rash   Lymph nodes:   No palpable adenopathy   Neurologic:   Cranial nerves 2 - 12 grossly intact, sensation intact, DTR        present and equal bilaterally             Condition on Discharge:  Stable    Urine Output Good    Discharge Diet: Regular    Discharge Medications  Motrin 800 mg q 6 #30    Activity at Discharge: No driving x 2 weeks. Nothing per vagina until cleared by a physician. Patient expected to return to work or school in 6 weeks.     Follow-up Appointments  Patient will follow up with Dr. Wood in 2 weeks.        Nav Wood MD.   03/21/21  13:18 EDT

## 2021-03-21 NOTE — DISCHARGE INSTRUCTIONS
No lifting pushing or pulling over 10 lbs for 6 weeks.  No tampons douching or intercourse for 6 weeks.  You may shower but no tub baths or submersion in water for 6 weeks.  No driving for 2 weeks.   Notify your doctor for fever,chills, worsening abdominal pain, vaginal bleeding heavier than a period or passing clots, foul odor to your vaginal discharge, swelling in your hands face or feet or visual changes such as blurry or spotty vision.  See attached education sheets.

## 2021-03-21 NOTE — PLAN OF CARE
Goal Outcome Evaluation:  Plan of Care Reviewed With: patient, spouse  small rubra/rates pain acceptable with po meds/states confidence in ability to provide self care at home/voiding without difficulty

## 2021-12-08 ENCOUNTER — APPOINTMENT (OUTPATIENT)
Dept: ULTRASOUND IMAGING | Facility: HOSPITAL | Age: 21
End: 2021-12-08

## 2021-12-08 ENCOUNTER — HOSPITAL ENCOUNTER (OUTPATIENT)
Facility: HOSPITAL | Age: 21
Discharge: HOME | End: 2021-12-08
Attending: OBSTETRICS & GYNECOLOGY | Admitting: OBSTETRICS & GYNECOLOGY

## 2021-12-08 ENCOUNTER — HOSPITAL ENCOUNTER (OUTPATIENT)
Facility: HOSPITAL | Age: 21
End: 2021-12-08
Attending: OBSTETRICS & GYNECOLOGY | Admitting: OBSTETRICS & GYNECOLOGY

## 2021-12-08 VITALS
HEIGHT: 62 IN | RESPIRATION RATE: 20 BRPM | WEIGHT: 115 LBS | SYSTOLIC BLOOD PRESSURE: 107 MMHG | TEMPERATURE: 97.16 F | BODY MASS INDEX: 21.16 KG/M2 | OXYGEN SATURATION: 98 % | DIASTOLIC BLOOD PRESSURE: 69 MMHG | HEART RATE: 78 BPM

## 2021-12-08 PROBLEM — O42.919 PRETERM PREMATURE RUPTURE OF MEMBRANES (PPROM) WITH UNKNOWN ONSET OF LABOR: Status: ACTIVE | Noted: 2021-12-08

## 2021-12-08 PROBLEM — Z34.90 PREGNANCY: Status: ACTIVE | Noted: 2021-12-08

## 2021-12-08 LAB
ABO GROUP BLD: NORMAL
AMPHET+METHAMPHET UR QL: NEGATIVE
AMPHETAMINES UR QL: NEGATIVE
BARBITURATES UR QL SCN: NEGATIVE
BASOPHILS # BLD AUTO: 0.02 10*3/MM3 (ref 0–0.2)
BASOPHILS NFR BLD AUTO: 0.2 % (ref 0–1.5)
BENZODIAZ UR QL SCN: NEGATIVE
BLD GP AB SCN SERPL QL: NEGATIVE
BUPRENORPHINE SERPL-MCNC: NEGATIVE NG/ML
CANNABINOIDS SERPL QL: NEGATIVE
COCAINE UR QL: NEGATIVE
DEPRECATED RDW RBC AUTO: 42.1 FL (ref 37–54)
EOSINOPHIL # BLD AUTO: 0.12 10*3/MM3 (ref 0–0.4)
EOSINOPHIL NFR BLD AUTO: 1.3 % (ref 0.3–6.2)
ERYTHROCYTE [DISTWIDTH] IN BLOOD BY AUTOMATED COUNT: 12.2 % (ref 12.3–15.4)
HBV SURFACE AG SERPL QL IA: NORMAL
HCT VFR BLD AUTO: 32.8 % (ref 34–46.6)
HCV AB SER DONR QL: NORMAL
HGB BLD-MCNC: 11 G/DL (ref 12–15.9)
HIV1+2 AB SER QL: NORMAL
IMM GRANULOCYTES # BLD AUTO: 0.09 10*3/MM3 (ref 0–0.05)
IMM GRANULOCYTES NFR BLD AUTO: 0.9 % (ref 0–0.5)
LYMPHOCYTES # BLD AUTO: 1.95 10*3/MM3 (ref 0.7–3.1)
LYMPHOCYTES NFR BLD AUTO: 20.5 % (ref 19.6–45.3)
MCH RBC QN AUTO: 32.3 PG (ref 26.6–33)
MCHC RBC AUTO-ENTMCNC: 33.5 G/DL (ref 31.5–35.7)
MCV RBC AUTO: 96.2 FL (ref 79–97)
METHADONE UR QL SCN: NEGATIVE
MONOCYTES # BLD AUTO: 0.55 10*3/MM3 (ref 0.1–0.9)
MONOCYTES NFR BLD AUTO: 5.8 % (ref 5–12)
NEUTROPHILS NFR BLD AUTO: 6.76 10*3/MM3 (ref 1.7–7)
NEUTROPHILS NFR BLD AUTO: 71.3 % (ref 42.7–76)
NRBC BLD AUTO-RTO: 0 /100 WBC (ref 0–0.2)
OPIATES UR QL: NEGATIVE
OXYCODONE UR QL SCN: NEGATIVE
PCP UR QL SCN: NEGATIVE
PLATELET # BLD AUTO: 219 10*3/MM3 (ref 140–450)
PMV BLD AUTO: 10.6 FL (ref 6–12)
PROPOXYPH UR QL: NEGATIVE
RBC # BLD AUTO: 3.41 10*6/MM3 (ref 3.77–5.28)
RH BLD: NEGATIVE
SARS-COV-2 RNA RESP QL NAA+PROBE: NOT DETECTED
T&S EXPIRATION DATE: NORMAL
TRICYCLICS UR QL SCN: NEGATIVE
WBC NRBC COR # BLD: 9.49 10*3/MM3 (ref 3.4–10.8)

## 2021-12-08 PROCEDURE — 51703 INSERT BLADDER CATH COMPLEX: CPT

## 2021-12-08 PROCEDURE — 80306 DRUG TEST PRSMV INSTRMNT: CPT | Performed by: OBSTETRICS & GYNECOLOGY

## 2021-12-08 PROCEDURE — 86803 HEPATITIS C AB TEST: CPT | Performed by: OBSTETRICS & GYNECOLOGY

## 2021-12-08 PROCEDURE — 76805 OB US >/= 14 WKS SNGL FETUS: CPT | Performed by: RADIOLOGY

## 2021-12-08 PROCEDURE — 87340 HEPATITIS B SURFACE AG IA: CPT | Performed by: OBSTETRICS & GYNECOLOGY

## 2021-12-08 PROCEDURE — 85025 COMPLETE CBC W/AUTO DIFF WBC: CPT | Performed by: OBSTETRICS & GYNECOLOGY

## 2021-12-08 PROCEDURE — 0 MAGNESIUM SULFATE 20 GM/500ML SOLUTION

## 2021-12-08 PROCEDURE — 86850 RBC ANTIBODY SCREEN: CPT | Performed by: OBSTETRICS & GYNECOLOGY

## 2021-12-08 PROCEDURE — 51702 INSERT TEMP BLADDER CATH: CPT

## 2021-12-08 PROCEDURE — 76805 OB US >/= 14 WKS SNGL FETUS: CPT

## 2021-12-08 PROCEDURE — 96372 THER/PROPH/DIAG INJ SC/IM: CPT

## 2021-12-08 PROCEDURE — 25010000002 AMPICILLIN PER 500 MG: Performed by: OBSTETRICS & GYNECOLOGY

## 2021-12-08 PROCEDURE — 86900 BLOOD TYPING SEROLOGIC ABO: CPT | Performed by: OBSTETRICS & GYNECOLOGY

## 2021-12-08 PROCEDURE — 96365 THER/PROPH/DIAG IV INF INIT: CPT

## 2021-12-08 PROCEDURE — 36415 COLL VENOUS BLD VENIPUNCTURE: CPT | Performed by: OBSTETRICS & GYNECOLOGY

## 2021-12-08 PROCEDURE — 86901 BLOOD TYPING SEROLOGIC RH(D): CPT | Performed by: OBSTETRICS & GYNECOLOGY

## 2021-12-08 PROCEDURE — 25010000002 AZITHROMYCIN PER 500 MG: Performed by: OBSTETRICS & GYNECOLOGY

## 2021-12-08 PROCEDURE — G0432 EIA HIV-1/HIV-2 SCREEN: HCPCS | Performed by: OBSTETRICS & GYNECOLOGY

## 2021-12-08 PROCEDURE — C9803 HOPD COVID-19 SPEC COLLECT: HCPCS

## 2021-12-08 PROCEDURE — 59025 FETAL NON-STRESS TEST: CPT

## 2021-12-08 PROCEDURE — 25010000002 BETAMETHASONE ACET & SOD PHOS PER 4 MG: Performed by: OBSTETRICS & GYNECOLOGY

## 2021-12-08 PROCEDURE — U0003 INFECTIOUS AGENT DETECTION BY NUCLEIC ACID (DNA OR RNA); SEVERE ACUTE RESPIRATORY SYNDROME CORONAVIRUS 2 (SARS-COV-2) (CORONAVIRUS DISEASE [COVID-19]), AMPLIFIED PROBE TECHNIQUE, MAKING USE OF HIGH THROUGHPUT TECHNOLOGIES AS DESCRIBED BY CMS-2020-01-R: HCPCS | Performed by: OBSTETRICS & GYNECOLOGY

## 2021-12-08 PROCEDURE — G0463 HOSPITAL OUTPT CLINIC VISIT: HCPCS

## 2021-12-08 RX ORDER — SODIUM CHLORIDE 0.9 % (FLUSH) 0.9 %
10 SYRINGE (ML) INJECTION AS NEEDED
Status: DISCONTINUED | OUTPATIENT
Start: 2021-12-08 | End: 2021-12-08 | Stop reason: HOSPADM

## 2021-12-08 RX ORDER — ONDANSETRON 4 MG/1
4 TABLET, FILM COATED ORAL EVERY 8 HOURS PRN
Status: DISCONTINUED | OUTPATIENT
Start: 2021-12-08 | End: 2021-12-08 | Stop reason: HOSPADM

## 2021-12-08 RX ORDER — SODIUM CHLORIDE 0.9 % (FLUSH) 0.9 %
3 SYRINGE (ML) INJECTION EVERY 12 HOURS SCHEDULED
Status: DISCONTINUED | OUTPATIENT
Start: 2021-12-08 | End: 2021-12-08 | Stop reason: HOSPADM

## 2021-12-08 RX ORDER — ONDANSETRON 2 MG/ML
4 INJECTION INTRAMUSCULAR; INTRAVENOUS EVERY 8 HOURS PRN
Status: DISCONTINUED | OUTPATIENT
Start: 2021-12-08 | End: 2021-12-08 | Stop reason: HOSPADM

## 2021-12-08 RX ORDER — BETAMETHASONE SODIUM PHOSPHATE AND BETAMETHASONE ACETATE 3; 3 MG/ML; MG/ML
12 INJECTION, SUSPENSION INTRA-ARTICULAR; INTRALESIONAL; INTRAMUSCULAR; SOFT TISSUE EVERY 24 HOURS
Status: DISCONTINUED | OUTPATIENT
Start: 2021-12-08 | End: 2021-12-08

## 2021-12-08 RX ORDER — LIDOCAINE HYDROCHLORIDE 10 MG/ML
5 INJECTION, SOLUTION EPIDURAL; INFILTRATION; INTRACAUDAL; PERINEURAL AS NEEDED
Status: DISCONTINUED | OUTPATIENT
Start: 2021-12-08 | End: 2021-12-08 | Stop reason: HOSPADM

## 2021-12-08 RX ORDER — SODIUM CHLORIDE 0.9 % (FLUSH) 0.9 %
10 SYRINGE (ML) INJECTION EVERY 12 HOURS SCHEDULED
Status: DISCONTINUED | OUTPATIENT
Start: 2021-12-08 | End: 2021-12-08 | Stop reason: HOSPADM

## 2021-12-08 RX ORDER — BETAMETHASONE SODIUM PHOSPHATE AND BETAMETHASONE ACETATE 3; 3 MG/ML; MG/ML
12 INJECTION, SUSPENSION INTRA-ARTICULAR; INTRALESIONAL; INTRAMUSCULAR; SOFT TISSUE EVERY 24 HOURS
Status: DISCONTINUED | OUTPATIENT
Start: 2021-12-08 | End: 2021-12-08 | Stop reason: HOSPADM

## 2021-12-08 RX ORDER — SODIUM CHLORIDE, SODIUM LACTATE, POTASSIUM CHLORIDE, CALCIUM CHLORIDE 600; 310; 30; 20 MG/100ML; MG/100ML; MG/100ML; MG/100ML
125 INJECTION, SOLUTION INTRAVENOUS CONTINUOUS
Status: DISCONTINUED | OUTPATIENT
Start: 2021-12-08 | End: 2021-12-08 | Stop reason: HOSPADM

## 2021-12-08 RX ORDER — MAGNESIUM SULFATE HEPTAHYDRATE 40 MG/ML
4 INJECTION, SOLUTION INTRAVENOUS ONCE
Status: COMPLETED | OUTPATIENT
Start: 2021-12-08 | End: 2021-12-08

## 2021-12-08 RX ORDER — MAGNESIUM SULFATE HEPTAHYDRATE 40 MG/ML
INJECTION, SOLUTION INTRAVENOUS
Status: COMPLETED
Start: 2021-12-08 | End: 2021-12-08

## 2021-12-08 RX ADMIN — BETAMETHASONE SODIUM PHOSPHATE AND BETAMETHASONE ACETATE 12 MG: 3; 3 INJECTION, SUSPENSION INTRA-ARTICULAR; INTRALESIONAL; INTRAMUSCULAR at 10:20

## 2021-12-08 RX ADMIN — AMPICILLIN SODIUM 2 G: 2 INJECTION, POWDER, FOR SOLUTION INTRAVENOUS at 10:17

## 2021-12-08 RX ADMIN — MAGNESIUM SULFATE HEPTAHYDRATE 4 G: 40 INJECTION, SOLUTION INTRAVENOUS at 10:31

## 2021-12-08 RX ADMIN — AZITHROMYCIN MONOHYDRATE 500 MG: 500 INJECTION, POWDER, LYOPHILIZED, FOR SOLUTION INTRAVENOUS at 10:54

## 2021-12-08 NOTE — DISCHARGE SUMMARY
Discharge Summary    Admit Date: 2021  8:53 AM    Admit Diagnosis: Pregnancy [Z34.90]   premature rupture of membranes (PPROM) with unknown onset of labor [O42.919]    Date of Discharge:  2021    Discharge Diagnosis: Same        Hospital Course  Patient is a 21 y.o. female, G 3, P . Patient was admitted due to leakage of fluid  labor, P PROM.  Patient's had no prenatal care.  Ultrasound is on the way to perform bedside ultrasound for gestational age.  Patient is admitted here labs are ordered including a prenatal panel ultrasound is ordered and ampicillin azithromycin is started.    Vital Signs  Temp:  [96.5 °F (35.8 °C)] 96.5 °F (35.8 °C)  Heart Rate:  [76] 76  BP: (126)/(74) 126/74    Review of Systems    The following systems were reviewed and negative; Contractions, vaginal bleeding chest pain shortness of breath     The following systems were reviewed and positive; fetal movement, leakage of fluid         Physical Exam:      General Appearance:   NAD   Head:   NC   Eyes:           PEARLA   Ears:  deferred   Throat: deferred   Neck: No JVD   Back:    No CVAT   Lungs:    CTAB    Heart:   RRR    Breast Exam:  deferred   Abdomen:    gravid uterus 2 cm above the umbilicus.  Non-tender   Genitalia:   Cervix is 2 to 3 cm dilated 75% effaced cephalic presentation noted grossly ruptured   Extremities:  No c/c/e   Pulses:  Normal                  A/P:  1 no prenatal care gestational age estimated between 22 to 25 weeks ultrasound is pending.  OB panel is ordered.  2.  PPROM-ampicillin azithromycin is started.  I have discussed this case with Dr. Barrera at [facility] and patient will be transferred for [facility] service in proximity to NICU.  Will await ultrasound for gestational age to determine Celestone administration.        Condition on Discharge:  Stable    Discharge Diet: Regular             Ade Bennett DO  21  09:45 EST

## 2021-12-08 NOTE — H&P
ADRIEL Martinez  Obstetric History and Physical    No chief complaint on file.      Subjective     Patient is a 21 y.o. female  currently at Unknown gestational age, who presents with gross leakage of fluid this morning around 5 or 6 AM.  Patient delivered a full-term approximately 8 months ago.  States that she was seen at  children services in [place] last week and told that her blood test she was around 7 weeks pregnant.  However her uterus is above the umbilicus and bedside ultrasound notes large fetus with heart beat.  Patient is having gross fluid from the vagina and is 2 cm dilated 75% effaced with a fetal head that is palpable.  She denies bleeding or contractions.  Patient has had no prenatal care at this point.    Her prenatal care is complicated by  insufficient prenatal care .  Her previous obstetric/gynecological history is noted for is non-contributory.    The following portions of the patients history were reviewed and updated as appropriate: current medications, allergies, past medical history, past surgical history, past family history, past social history and problem list .       Prenatal Information:   Maternal Prenatal Labs  Blood Type No results found for: ABO   Rh Status No results found for: RH   Antibody Screen No results found for: ABSCRN   Rapid Urine Drug Screen No results found for: AMPMETHU, BARBITSCNUR, LABBENZSCN, LABMETHSCN, LABOPIASCN, THCURSCR, COCAINEUR, AMPHETSCREEN, PROPOXSCN, BUPRENORSCNU, METAMPSCNUR, OXYCODONESCN, TRICYCLICSCN   Group B Strep Culture No results found for: GBSANTIGEN           External Prenatal Results    The patient does not have a working PRESLEY. Some results will not display without a working PRESLEY.   Pregnancy Outside Results - Transcribed From Office Records - See Scanned Records For Details     Test Value Date Time    ABO  O  21 0643    Rh  Negative  21 0643    Antibody Screen       Varicella IgG       Rubella       Hgb       Hct        Glucose Fasting GTT       Glucose Tolerance Test 1 hour       Glucose Tolerance Test 3 hour       Gonorrhea (discrete)       Chlamydia (discrete)       RPR       VDRL       Syphilis Antibody       HBsAg       Herpes Simplex Virus PCR       Herpes Simplex VIrus Culture       HIV       Hep C RNA Quant PCR       Hep C Antibody       AFP       Group B Strep       GBS Susceptibility to Clindamycin       GBS Susceptibility to Erythromycin       Fetal Fibronectin       Genetic Testing, Maternal Blood             Drug Screening     Test Value Date Time    Urine Drug Screen       Amphetamine Screen       Barbiturate Screen       Benzodiazepine Screen       Methadone Screen       Phencyclidine Screen       Opiates Screen       THC Screen       Cocaine Screen       Propoxyphene Screen       Buprenorphine Screen       Methamphetamine Screen       Oxycodone Screen       Tricyclic Antidepressants Screen             Legend    ^: Historical                          Past OB History:     OB History    Para Term  AB Living   3 2 2 0 0 2   SAB IAB Ectopic Molar Multiple Live Births   0 0 0 0 0 2      # Outcome Date GA Lbr Blake/2nd Weight Sex Delivery Anes PTL Lv   3 Current            2 Term 21 37w1d / 00:20 2983 g (6 lb 9.2 oz) F Vag-Spont EPI N NALLELY      Name: STEPHAN ROACH      Apgar1: 8  Apgar5: 9   1 Term 19 37w6d 02:48 / 00:14 2744 g (6 lb 0.8 oz) M Vag-Vacuum EPI N NALLELY      Birth Comments: 130 30 97.6      Name: ASTRID ROACH      Apgar1: 8  Apgar5: 9       Past Medical History: Past Medical History:   Diagnosis Date   • Anxiety    • Depression    • Development delay    • Urinary tract infection    • Urogenital trichomoniasis 2017      Past Surgical History Past Surgical History:   Procedure Laterality Date   • EYE SURGERY Bilateral     GROWTH REMOVAL      Family History: Family History   Problem Relation Age of Onset   • Lung cancer Father    • Heart disease Mother    • No Known Problems Brother     • Seizures Sister    • Hydrocephalus Sister    • Asthma Sister    • No Known Problems Paternal Grandfather    • No Known Problems Paternal Grandmother    • Hypertension Maternal Grandmother    • Diabetes Maternal Grandmother    • Coronary artery disease Maternal Grandmother    • Alzheimer's disease Maternal Grandmother    • Lung cancer Maternal Grandfather    • Cancer Paternal Uncle    • Stomach cancer Paternal Uncle       Social History:  reports that she has been smoking. She has a 5.00 pack-year smoking history. She has never used smokeless tobacco.   reports no history of alcohol use.   reports current drug use. Drug: Marijuana.        Review of Systems      Objective     Vital Signs Range for the last 24 hours  Temperature: Temp:  [96.5 °F (35.8 °C)] 96.5 °F (35.8 °C)   Temp Source: Temp src: Temporal   BP: BP: (126)/(74) 126/74   Pulse: Heart Rate:  [76] 76   Respirations:     Weight: Weight:  [52.2 kg (115 lb)] 52.2 kg (115 lb)     Physical Examination: General appearance - alert, well appearing, and in no distress  Abdomen -soft, nontender, gravid uterus approximately 2 cm above the umbilicus.  Extremities - no pedal edema noted  genitourinary-cervix is approximately 2 to 3 cm 75% effaced fetal head is palpable gross rupture of membranes is noted.    Presentation:  Cephalic   Cervix: Exam by: Dr. Bennett   Dilation:  2 cm   Effacement:  75%             Assessment/Plan       Pregnancy      Assessment & Plan    Assessment/Plan:  1.  Intrauterine pregnancy at Unknown weeks gestation with reassuring fetal status.    2.  No prenatal care-will order an ultrasound for dating.  Will order prenatal panel now.  3.  Probable P PROM-we'll consult with maternal-fetal medicine on recommendations for steroids latency antibiotics given unknown gestational age.    Ade Bennett DO  12/8/2021  09:20 EST